# Patient Record
Sex: FEMALE | Race: WHITE | HISPANIC OR LATINO | Employment: FULL TIME | URBAN - METROPOLITAN AREA
[De-identification: names, ages, dates, MRNs, and addresses within clinical notes are randomized per-mention and may not be internally consistent; named-entity substitution may affect disease eponyms.]

---

## 2017-10-16 ENCOUNTER — HOSPITAL ENCOUNTER (OUTPATIENT)
Facility: MEDICAL CENTER | Age: 37
End: 2017-10-16
Attending: SPECIALIST
Payer: COMMERCIAL

## 2017-10-16 PROCEDURE — 87491 CHLMYD TRACH DNA AMP PROBE: CPT

## 2017-10-16 PROCEDURE — 87624 HPV HI-RISK TYP POOLED RSLT: CPT

## 2017-10-16 PROCEDURE — 87591 N.GONORRHOEAE DNA AMP PROB: CPT

## 2017-10-16 PROCEDURE — 88175 CYTOPATH C/V AUTO FLUID REDO: CPT

## 2017-10-19 LAB
C TRACH DNA GENITAL QL NAA+PROBE: NEGATIVE
CYTOLOGY REG CYTOL: NORMAL
HPV HR 12 DNA CVX QL NAA+PROBE: NEGATIVE
HPV16 DNA SPEC QL NAA+PROBE: NEGATIVE
HPV18 DNA SPEC QL NAA+PROBE: NEGATIVE
N GONORRHOEA DNA GENITAL QL NAA+PROBE: NEGATIVE
SPECIMEN SOURCE: NORMAL
SPECIMEN SOURCE: NORMAL

## 2018-01-23 ENCOUNTER — HOSPITAL ENCOUNTER (OUTPATIENT)
Dept: LAB | Facility: MEDICAL CENTER | Age: 38
End: 2018-01-23
Attending: SPECIALIST
Payer: COMMERCIAL

## 2018-01-23 PROCEDURE — 87624 HPV HI-RISK TYP POOLED RSLT: CPT

## 2018-01-23 PROCEDURE — 87591 N.GONORRHOEAE DNA AMP PROB: CPT

## 2018-01-23 PROCEDURE — 88175 CYTOPATH C/V AUTO FLUID REDO: CPT

## 2018-01-23 PROCEDURE — 87491 CHLMYD TRACH DNA AMP PROBE: CPT

## 2018-01-26 LAB
C TRACH DNA GENITAL QL NAA+PROBE: NEGATIVE
CYTOLOGY REG CYTOL: ABNORMAL
HPV HR 12 DNA CVX QL NAA+PROBE: POSITIVE
HPV16 DNA SPEC QL NAA+PROBE: NEGATIVE
HPV18 DNA SPEC QL NAA+PROBE: NEGATIVE
N GONORRHOEA DNA GENITAL QL NAA+PROBE: NEGATIVE
SPECIMEN SOURCE: ABNORMAL
SPECIMEN SOURCE: ABNORMAL

## 2018-01-27 ENCOUNTER — HOSPITAL ENCOUNTER (EMERGENCY)
Facility: MEDICAL CENTER | Age: 38
End: 2018-01-27
Attending: EMERGENCY MEDICINE
Payer: COMMERCIAL

## 2018-01-27 VITALS
SYSTOLIC BLOOD PRESSURE: 103 MMHG | BODY MASS INDEX: 35.63 KG/M2 | HEART RATE: 68 BPM | TEMPERATURE: 97.6 F | WEIGHT: 213.85 LBS | DIASTOLIC BLOOD PRESSURE: 69 MMHG | HEIGHT: 65 IN | OXYGEN SATURATION: 97 % | RESPIRATION RATE: 17 BRPM

## 2018-01-27 DIAGNOSIS — N39.0 URINARY TRACT INFECTION WITHOUT HEMATURIA, SITE UNSPECIFIED: ICD-10-CM

## 2018-01-27 LAB
APPEARANCE UR: CLEAR
COLOR UR: YELLOW
GLUCOSE UR STRIP.AUTO-MCNC: NEGATIVE MG/DL
KETONES UR STRIP.AUTO-MCNC: NEGATIVE MG/DL
LEUKOCYTE ESTERASE UR QL STRIP.AUTO: ABNORMAL
NITRITE UR QL STRIP.AUTO: NEGATIVE
PH UR STRIP.AUTO: 5.5 [PH]
PROT UR QL STRIP: ABNORMAL MG/DL
RBC UR QL AUTO: NEGATIVE
SP GR UR STRIP.AUTO: 1.02

## 2018-01-27 PROCEDURE — 99284 EMERGENCY DEPT VISIT MOD MDM: CPT

## 2018-01-27 PROCEDURE — 81002 URINALYSIS NONAUTO W/O SCOPE: CPT

## 2018-01-27 RX ORDER — CEFDINIR 300 MG/1
300 CAPSULE ORAL 2 TIMES DAILY
Qty: 20 CAP | Refills: 0 | Status: SHIPPED | OUTPATIENT
Start: 2018-01-27 | End: 2018-08-01

## 2018-01-27 ASSESSMENT — PAIN SCALES - GENERAL: PAINLEVEL_OUTOF10: 3

## 2018-01-27 NOTE — ED NOTES
Discharge instructions provided.  Pt verbalized the understanding of discharge instructions to follow up with PCP and to return to ER if condition worsens. Therese Stephens is being discharged with the following medication prescriptions Omnicef.  Pt ambulated out of ER without difficulty.

## 2018-01-27 NOTE — DISCHARGE INSTRUCTIONS
Infección urinaria en el embarazo   (Urinary Tract Infection in Pregnancy)  Miya infección urinaria (IU) puede ocurrir en cualquier lugar del tracto urinario. La infección urinaria puede comprometer los utéteres, los riñones (pielonefritis), la vejiga (cistitis) y la uretra (uretritis). Todas las mujeres embarazadas deben ser estudiadas para diagnosticar la presencia de bacterias en el tracto urinario. La identificación y el tratamiento de miya infección urinaria disminuye el riesgo de un parto prematuro y de desarrollar infecciones más graves en la madre y el bebé.   CAUSAS   Las bacterias causan perez todas las infecciones urinarias. Hay muchos factores que pueden aumentar teresa probabilidades de contraer miya infección urinaria huy el embarazo. Ellos son:   · Tener miya uretra corta.  · Falta de aseo y malos hábitos de higiene.  · Las relaciones sexuales.  · Obstrucción de la orina en el tracto urinario.  · Problemas con los músculos o nervios pélvicos.  · Diabetes.  · Obesidad.  · Problemas en la vejiga después de tener varios hijos.  · Antecedentes de infección urinaria.  SÍNTOMAS   · Dolor, ardor o sensación de ardor al orinar.  · Sentir la necesidad de orinar de inmediato (urgencia).  · Pérdida del control vesical (incontinencia urinaria).  · Orinar con más frecuencia de lo común en el embarazo.  · Malestar en la sidney inferior del abdomen o en la espalda.  · Orina turbia.  · Gerry en la orina (hematuria).  · Fiebre.   Cuando se infectan los riñones, los síntomas pueden ser:   · Dolor de espalda.  · Dolor lateral en el lado derecho más que el lado lakwhinder.  · Fiebre.  · Escalofríos.  · Náuseas.  · Vómitos.  DIAGNÓSTICO   · Análisis de orina.  · Los estudios o procedimientos adicionales pueden ser:  · Ecografía de los riñones, los uréteres, la vejiga y la uretra.  · Observar la vejiga con un tubo que ilumina (cistoscopia).  · Ciertos estudios radiográficos sólo cuando sea absolutamente necesario.  Averigüe los  resultados de teresa análisis  Consulte la fecha en que los resultados estarán disponibles. Asegúrese de obtener los resultados.   TRATAMIENTO   · Antibióticos por vía oral.  · Antibióticos por la vena (vía intravenosa), si es necesario.  INSTRUCCIONES PARA EL CUIDADO EN EL HOGAR   · Chacra los antibióticos norberto se le indicó. Tómelos todos, aunque se sienta mejor. Chacra sólo la medicación que le indicó el profesional.  · Debe ingerir miya gran cantidad de líquido para mantener la orina de yoan donte o color amarillo pálido.  · No tenga relaciones sexuales hasta que la infección haya desaparecido o el médico la autorice.  · Asegúrese de que le dorothy estudios para detectar miya infección urinaria huy el embarazo si está embarazada. Estas infecciones suelen reaparecer.   Para prevenir miya infección urinaria en el futuro:  · Practique buenos hábitos higiénicos. Siempre debe limpiarse desde adelante hacia atrás. Use el tissue sólo miya vez.  · No retenga la orina. Orine walton pronto norberto sea posible cuando tenga ganas.  · No se zainab duchas vaginales ni use desodorantes en aerosol.  · Lave con agua tibia y jabón alrededor de la sidney genital y el ano.  · Vacíe la vejiga antes y después de tener relaciones sexuales.  · Use ropa interior con algodón en la entrepierna.  · Evite la cafeína y las bebidas gaseosas. Estas sustancias irritan la vejiga.  · Ki jugo de arándanos o tome comprimidos de arándano. [place] puede disminuir el riesgo de sufrir miya infección urinaria.  · No ki alcohol.  · Cumpla con las visitas de control y hágase todos los análisis según lo programado.   SOLICITE ATENCIÓN MÉDICA SI:   · Los síntomas empeoran.  · Tiene fiebre aún después de 2 días de iniciado el tratamiento.  · Aparece miya erupción cutánea.  · Siente que usted tiene problemas con los medicamentos recetados.  · Observa miya hemorragia vaginal anormal.  SOLICITE ATENCIÓN MÉDICA DE INMEDIATO SI:   · Siente dolor en la espalda o en un  flanco.  · Comienza a sentir escalofríos.  · Observa [place] en la orina.  · Presenta náuseas o vómitos.  · Siente contracciones en el útero.  · Tiene miya perdida repentina de líquido por la vagina.  ASEGÚRESE DE QUE:   · Comprende estas instrucciones.  · Controlará shah enfermedad.  · Solicitará ayuda de inmediato si no mejora o si empeora.  Document Released: 09/11/2013 Document Revised: 12/04/2013  [employer]® Patient Information ©2014 [employer].

## 2018-01-27 NOTE — ED PROVIDER NOTES
"ED Provider Note  CHIEF COMPLAINT  Chief Complaint   Patient presents with   • Pregnancy   • Back Pain       HPI  Therese Stephens is a 37 y.o. female who presents back pain and dysuria. The patient states that she is 9 weeks pregnant and started having back pain earlier this morning. The pain is dull in nature. No radiation to her groin or to her chest. She also states that she has painful urination with no blood involved. She is pregnant but denies any abdominal pain, vaginal bleeding, vaginal discharge and has had a positive intrauterine pregnancy ultrasound completed.     REVIEW OF SYSTEMS  Pertinent positives include dysuria, back pain   Pertinent negatives include fever, nausea, vomiting, abdominal pain    PAST MEDICAL HISTORY  History reviewed. No pertinent past medical history.    FAMILY HISTORY  History reviewed. No pertinent family history.    SOCIAL HISTORY  Social History     Social History   • Marital status:      Spouse name: N/A   • Number of children: N/A   • Years of education: N/A     Social History Main Topics   • Smoking status: Never Smoker   • Smokeless tobacco: Never Used   • Alcohol use No   • Drug use: No   • Sexual activity: Not on file     Other Topics Concern   • Not on file     Social History Narrative   • No narrative on file       SURGICAL HISTORY  History reviewed. No pertinent surgical history.    CURRENT MEDICATIONS  Home Medications    **Home medications have not yet been reviewed for this encounter**         ALLERGIES  No Known Allergies    PHYSICAL EXAM  VITAL SIGNS: /71   Pulse 77   Temp 36.4 °C (97.6 °F)   Resp 16   Ht 1.651 m (5' 5\") Comment: Stated  Wt 97 kg (213 lb 13.5 oz)   LMP 11/24/2017   SpO2 100%   BMI 35.59 kg/m²    Constitutional: Well developed, Well nourished, No acute distress, Non-toxic appearance.   Eyes: PERRLA, EOMI, Conjunctiva normal, No discharge.   Abdomen: Bowel sounds normal, Soft, No tenderness, No guarding, No rebound, No masses, No " pulsatile masses, negative Wasserman's sign, no McBurney point tenderness, no peritoneal signs.   Back: No midline thoracic and lumbar spine tenderness, No bilateral CVA tenderness.   Extremities:  No edema,no obvious deformity, no evidence of trauma  Neurologic: Alert & oriented x 3, No focal deficits noted, normal gait.      RADIOLOGY/PROCEDURES  Bedside ultrasound revealed an intrauterine pregnancy with a heart rate 142 bpm    COURSE & MEDICAL DECISION MAKING  Pertinent Labs & Imaging studies reviewed. (See chart for details)  This is a charming 37-year-old female who is 9 weeks pregnant presents with urinary tract infection. Urinalysis is positive for infection. Culture has been sent. Patient received Omnicef prescription and strict return precautions have been given for increasing symptomatology. The patient has no evidence of kidney stone, appendicitis, cholecystitis or other significant intra-abdominal surgical conditions. In addition she has no evidence of  rupture of membranes or significant abnormality or pregnancy. She is to follow-up with her gynecologist at her prescheduled point.    New Prescriptions    CEFDINIR (OMNICEF) 300 MG CAP    Take 1 Cap by mouth 2 times a day.         FINAL IMPRESSION     1. Urinary tract infection without hematuria, site unspecified        DISPOSITION:  Patient will be discharged home in stable condition.    FOLLOW UP:  [address] [phone]    If symptoms worsen    Sabrina Norman M.D.  [address] [place] [id number] [phone]    Schedule an appointment as soon as possible for a visit in 3 days  As needed      OUTPATIENT MEDICATIONS:  Discharge Medication List as of 2018  3:12 PM      START taking these medications    Details   cefdinir (OMNICEF) 300 MG Cap Take 1 Cap by mouth 2 times a day., Disp-20 Cap, R-0, Print Rx Paper             Electronically signed by: Cezar Lugo,  1/27/2018 1:58 PM

## 2018-08-01 ENCOUNTER — OFFICE VISIT (OUTPATIENT)
Dept: INTERNAL MEDICINE | Facility: MEDICAL CENTER | Age: 38
End: 2018-08-01
Payer: COMMERCIAL

## 2018-08-01 VITALS
WEIGHT: 241.38 LBS | TEMPERATURE: 96.8 F | OXYGEN SATURATION: 95 % | HEIGHT: 64 IN | DIASTOLIC BLOOD PRESSURE: 86 MMHG | HEART RATE: 89 BPM | BODY MASS INDEX: 41.21 KG/M2 | SYSTOLIC BLOOD PRESSURE: 116 MMHG

## 2018-08-01 DIAGNOSIS — R21 GROIN RASH: ICD-10-CM

## 2018-08-01 DIAGNOSIS — I10 HYPERTENSION, UNSPECIFIED TYPE: ICD-10-CM

## 2018-08-01 DIAGNOSIS — Z3A.35 35 WEEKS GESTATION OF PREGNANCY: ICD-10-CM

## 2018-08-01 PROBLEM — Z34.90 PREGNANCY: Status: ACTIVE | Noted: 2018-08-01

## 2018-08-01 PROCEDURE — 99203 OFFICE O/P NEW LOW 30 MIN: CPT | Mod: GE | Performed by: INTERNAL MEDICINE

## 2018-08-01 RX ORDER — CLOTRIMAZOLE 1 %
CREAM (GRAM) TOPICAL
Qty: 1 TUBE | Refills: 1 | Status: SHIPPED | OUTPATIENT
Start: 2018-08-01 | End: 2018-09-01

## 2018-08-01 ASSESSMENT — PATIENT HEALTH QUESTIONNAIRE - PHQ9: CLINICAL INTERPRETATION OF PHQ2 SCORE: 0

## 2018-08-01 NOTE — PROGRESS NOTES
New Patient to Establish    Reason to establish: New patient to establish    CC: High blood pressure, fullness of ear, groin rash    HPI: Ms. Stephens is a very pleasant 38-year-old female with no significant past medical history presents to clinic to establish care. She is currently 8 weeks pregnant and is due in 4 weeks.    She states that she had one recording of her blood pressure while in her GYN office and is here in follow-up regarding that. Today's visit was normal blood pressure, and she also states that subsequent visits have shown normal blood pressure and her OB/GYN office.    She also complains of fullness of ears bilaterally, and this been present for the last 8 months and start of pregnancy. She denies hearing loss or discharge from the ear, also denies any ringing sensations.    Another complaint of the patient is rash in her groin. She notes it to be itching, and is tried hydrocortisone cream for a month which did not help with the rash.    She is also complaining of shortness of breath while lying down. She understands that this is normal during pregnancy.    Patient Active Problem List    Diagnosis Date Noted   • Pregnancy 08/01/2018       History reviewed. No pertinent past medical history.    Current Outpatient Prescriptions   Medication Sig Dispense Refill   • Prenatal Multivit-Min-Fe-FA (PRENATAL VITAMINS PO) Take  by mouth.     • clotrimazole (LOTRIMIN) 1 % Cream Apply over rash on groin area. 1 Tube 1     No current facility-administered medications for this visit.        Allergies as of 08/01/2018   • (No Known Allergies)       Social History     Social History   • Marital status:      Spouse name: N/A   • Number of children: N/A   • Years of education: N/A     Occupational History   • Not on file.     Social History Main Topics   • Smoking status: Never Smoker   • Smokeless tobacco: Never Used   • Alcohol use No   • Drug use: No   • Sexual activity: Not on file     Other Topics Concern  "  • Not on file     Social History Narrative   • No narrative on file       Family History   Problem Relation Age of Onset   • Hypertension Neg Hx    • Heart Disease Neg Hx    • Stroke Neg Hx    • Cancer Neg Hx        History reviewed. No pertinent surgical history.    ROS: As per HPI. Additional pertinent symptoms as noted below.    Constitutional: Denies fevers, Denies weight changes  Eyes: Denies changes in vision, no eye pain  Ears/Nose/Throat/Mouth: Denies nasal congestion or sore throat   Cardiovascular: Denies chest pain or palpitations   Respiratory: Denies shortness of breath , Denies cough  Gastrointestinal/Hepatic: Denies abdominal pain, nausea, vomiting, diarrhea, constipation or GI bleeding   Genitourinary: Denies bladder dysfunction, dysuria or frequency  Musculoskeletal/Rheum: Denies  joint pain and swelling   Skin/Breast: Denies rash, denies breast lumps or discharge  Neurological: Denies headache, confusion, memory loss or focal weakness/parasthesias  Psychiatric: denies mood disorder   Endocrine: denies hx of diabetes or thyroid dysfunction  Heme/Oncology/Lymph Nodes: Denies enlarged lymph nodes, denies brusing or known bleeding disorder  Allergic/Immunologic: Denies hx of allergies            /86   Pulse 89   Temp 36 °C (96.8 °F)   Ht 1.626 m (5' 4\")   Wt 109.5 kg (241 lb 6 oz)   LMP 11/24/2017   SpO2 95%   BMI 41.43 kg/m²     Physical Exam  General:  Alert and oriented, No apparent distress.    Eyes: Pupils equal and reactive. No scleral icterus.    Throat: Clear no erythema or exudates noted.    Neck: Supple. No lymphadenopathy noted. Thyroid not enlarged.    Lungs: Clear to auscultation and percussion bilaterally.    Cardiovascular: Regular rate and rhythm. No murmurs, rubs or gallops.    Abdomen:  Benign. No rebound or guarding noted.    Extremities: No clubbing, cyanosis, edema.    Skin: Erythematous flaky rash noted bilateral groins    Note: I have reviewed all pertinent labs " and diagnostic tests associated with this visit with specific comments listed under the assessment and plan below    Assessment and Plan    1. 35 weeks gestation of pregnancy  Due in 4 weeks' time.  Following with OB/GYN, Dr. Chase.  Appears to have her get regular follow-up.  Advised patient to follow-up with her OB/GYN until term.  On prenatal vitamins.  States that she got routine screening labs done.    2. Hypertension, unspecified type  Does not appear to have high blood pressure.  This patient monitor blood pressure at home.  If she indeed have high blood pressure, this would be concerning for a clamshell and it could be in her best interest to follow-up with her GYN.  Advise patient that she should follow up with OB/GYN for further complaints regarding this.    3. Groin rash  Appears to be tenia cruris.  Hydrocortisone did not bark which is not the treatment of choice for the rash.  Clotrimazole cream for his rash.  It is a grade B for use in pregnancy, and is therefore safe as a topical medication.    4. Healthcare maintenance  Pap smear recently, not due for another 3-5 years.  Up-to-date on vaccines per patient.  Advised follow-up with OB/GYN.      Followup: Return if symptoms worsen or fail to improve, for Follow up after pregancy.        Signed by: Antoine Altman M.D.

## 2018-08-01 NOTE — PATIENT INSTRUCTIONS
Pitiriasis versicolor  (Tinea Versicolor)  La pitiriasis versicolor es miya infección frecuente por hongos en la piel. Produce miya erupción cutánea que se manifiesta norberto manchas claras u oscuras. Es más frecuente que aparezca en la piel del pecho, la espalda, el junie o la parte superior de los brazos. Esta afección es más frecuente huy climas cálidos.  Con excepción del aspecto de la piel, la pitiriasis versicolor por lo general no causa otros problemas. En la mayoría de los casos, la infección desaparece en unas pocas semanas con un tratamiento. Pueden pasar algunos meses para que las manchas desaparezcan.  CAUSAS  La pitiriasis versicolor se produce cuando comienza a proliferar un tipo de hongo que normalmente está presente en la piel. Jen hongo es miya clase de levadura. Se desconoce la causa exacta de la proliferación. Esta afección no puede transmitirse de miya persona a otra (es no contagiosa).  FACTORES DE RIESGO  Esta afección es más probable en presencia de determinados factores, norberto los siguientes:  · Calor y humedad.  · Sudoración excesiva.  · Cambios hormonales.  · Piel grasosa.  · El sistema de defensa (inmunitario) débil.  SÍNTOMAS  Los síntomas de esta afección pueden incluir lo siguiente:  · Miya erupción cutánea formada por manchas claras u oscuras. La erupción cutánea puede consistir en lo siguiente:  ¨ Zonas con manchas de color brina o tom sobre piel nik.  ¨ Zonas con manchas de color chamorro o marrón sobre piel oscura.  ¨ Zonas de piel con manchas que no se broncean.  ¨ Bordes shea definidos.  ¨ Escamas en las zonas pigmentadas.  · Picazón leve.  DIAGNÓSTICO  Por lo general, el médico puede diagnosticar esta afección al observarle la piel. Huy el examen, el médico puede usar miya davida ultravioleta norberto ayuda para determinar el ines de la infección. En algunos casos, puede tomarse miya muestra de piel rascando la sidney de la erupción cutánea. Esta muestra se analizará con el microscopio  para detectar la proliferación de la levadura.  TRATAMIENTO  El tratamiento de esta infección puede incluir lo siguiente:  · Champú anticaspa que se aplica sobre la piel afectada al sander miya ducha o un baño de inmersión.  · Jabones, lociones o cremas medicinales para la piel, de venta dacia.  · Medicamentos antimicóticos recetados en forma de comprimidos o crema para la piel.  · Medicamentos que ayudan a aliviar la picazón.  INSTRUCCIONES PARA EL CUIDADO EN EL HOGAR  · [place] los medicamentos solamente norberto se lo haya indicado el médico.  · Aplique el champú anticaspa en la sidney afectada si se lo ha indicado el médico. Pueden indicarle que se frote la sidney de piel afectada huy varios minutos cada día.  · No se rasque la sidney de piel afectada.  · Evite el clima cálido y húmedo.  · No use cabinas de bronceado.  · Trate de no transpirar demasiado.  SOLICITE ATENCIÓN MÉDICA SI:  · Los síntomas empeoran.  · Tiene fiebre.  · Presenta enrojecimiento, hinchazón o dolor en la sidney de la erupción cutánea.  · Observa líquido, [place] o pus que salen de la erupción cutánea.  · La erupción cutánea reaparece después del tratamiento.  Esta información no tiene norberto fin reemplazar el consejo del médico. Asegúrese de hacerle al médico cualquier pregunta que tenga.  Document Released: 09/27/2006 Document Revised: 01/08/2016 Document Reviewed: 09/29/2015  [employer] Interactive Patient Education © 2017 [employer]

## 2018-08-25 ENCOUNTER — HOSPITAL ENCOUNTER (OUTPATIENT)
Facility: MEDICAL CENTER | Age: 38
End: 2018-08-25
Attending: SPECIALIST | Admitting: SPECIALIST
Payer: COMMERCIAL

## 2018-08-25 VITALS
HEIGHT: 65 IN | TEMPERATURE: 98.8 F | DIASTOLIC BLOOD PRESSURE: 78 MMHG | HEART RATE: 65 BPM | WEIGHT: 245 LBS | BODY MASS INDEX: 40.82 KG/M2 | SYSTOLIC BLOOD PRESSURE: 111 MMHG

## 2018-08-25 PROCEDURE — 59025 FETAL NON-STRESS TEST: CPT | Performed by: SPECIALIST

## 2018-08-26 ENCOUNTER — HOSPITAL ENCOUNTER (INPATIENT)
Facility: MEDICAL CENTER | Age: 38
LOS: 3 days | End: 2018-08-29
Attending: SPECIALIST | Admitting: SPECIALIST
Payer: COMMERCIAL

## 2018-08-26 DIAGNOSIS — R10.2 FEMALE PELVIC PAIN: Primary | ICD-10-CM

## 2018-08-26 LAB
BASOPHILS # BLD AUTO: 0.3 % (ref 0–1.8)
BASOPHILS # BLD: 0.03 K/UL (ref 0–0.12)
EOSINOPHIL # BLD AUTO: 0.04 K/UL (ref 0–0.51)
EOSINOPHIL NFR BLD: 0.4 % (ref 0–6.9)
ERYTHROCYTE [DISTWIDTH] IN BLOOD BY AUTOMATED COUNT: 47 FL (ref 35.9–50)
HCT VFR BLD AUTO: 37.3 % (ref 37–47)
HGB BLD-MCNC: 12.6 G/DL (ref 12–16)
HOLDING TUBE BB 8507: NORMAL
IMM GRANULOCYTES # BLD AUTO: 0.06 K/UL (ref 0–0.11)
IMM GRANULOCYTES NFR BLD AUTO: 0.6 % (ref 0–0.9)
LYMPHOCYTES # BLD AUTO: 2.47 K/UL (ref 1–4.8)
LYMPHOCYTES NFR BLD: 24 % (ref 22–41)
MCH RBC QN AUTO: 28.2 PG (ref 27–33)
MCHC RBC AUTO-ENTMCNC: 33.8 G/DL (ref 33.6–35)
MCV RBC AUTO: 83.4 FL (ref 81.4–97.8)
MONOCYTES # BLD AUTO: 0.6 K/UL (ref 0–0.85)
MONOCYTES NFR BLD AUTO: 5.8 % (ref 0–13.4)
NEUTROPHILS # BLD AUTO: 7.08 K/UL (ref 2–7.15)
NEUTROPHILS NFR BLD: 68.9 % (ref 44–72)
NRBC # BLD AUTO: 0 K/UL
NRBC BLD-RTO: 0 /100 WBC
PLATELET # BLD AUTO: 338 K/UL (ref 164–446)
PMV BLD AUTO: 10 FL (ref 9–12.9)
RBC # BLD AUTO: 4.47 M/UL (ref 4.2–5.4)
WBC # BLD AUTO: 10.3 K/UL (ref 4.8–10.8)

## 2018-08-26 PROCEDURE — 85025 COMPLETE CBC W/AUTO DIFF WBC: CPT

## 2018-08-26 PROCEDURE — 770002 HCHG ROOM/CARE - OB PRIVATE (112)

## 2018-08-26 RX ORDER — MISOPROSTOL 200 UG/1
800 TABLET ORAL
Status: DISCONTINUED | OUTPATIENT
Start: 2018-08-26 | End: 2018-08-27 | Stop reason: HOSPADM

## 2018-08-26 RX ORDER — ENEMA 19; 7 G/133ML; G/133ML
1 ENEMA RECTAL PRN
Status: DISCONTINUED | OUTPATIENT
Start: 2018-08-26 | End: 2018-08-27 | Stop reason: HOSPADM

## 2018-08-26 RX ORDER — METHYLERGONOVINE MALEATE 0.2 MG/ML
0.2 INJECTION INTRAVENOUS
Status: DISCONTINUED | OUTPATIENT
Start: 2018-08-26 | End: 2018-08-27 | Stop reason: HOSPADM

## 2018-08-26 RX ORDER — HYDROXYZINE 50 MG/1
50 TABLET, FILM COATED ORAL EVERY 6 HOURS PRN
Status: DISCONTINUED | OUTPATIENT
Start: 2018-08-26 | End: 2018-08-27 | Stop reason: HOSPADM

## 2018-08-26 RX ORDER — SODIUM CHLORIDE, SODIUM LACTATE, POTASSIUM CHLORIDE, CALCIUM CHLORIDE 600; 310; 30; 20 MG/100ML; MG/100ML; MG/100ML; MG/100ML
INJECTION, SOLUTION INTRAVENOUS CONTINUOUS
Status: DISPENSED | OUTPATIENT
Start: 2018-08-26 | End: 2018-08-27

## 2018-08-26 RX ORDER — ALUMINA, MAGNESIA, AND SIMETHICONE 2400; 2400; 240 MG/30ML; MG/30ML; MG/30ML
30 SUSPENSION ORAL EVERY 6 HOURS PRN
Status: DISCONTINUED | OUTPATIENT
Start: 2018-08-26 | End: 2018-08-27 | Stop reason: HOSPADM

## 2018-08-26 RX ORDER — OXYTOCIN 10 [USP'U]/ML
10 INJECTION, SOLUTION INTRAMUSCULAR; INTRAVENOUS
Status: DISCONTINUED | OUTPATIENT
Start: 2018-08-26 | End: 2018-08-27 | Stop reason: HOSPADM

## 2018-08-26 ASSESSMENT — PATIENT HEALTH QUESTIONNAIRE - PHQ9
SUM OF ALL RESPONSES TO PHQ9 QUESTIONS 1 AND 2: 0
2. FEELING DOWN, DEPRESSED, IRRITABLE, OR HOPELESS: NOT AT ALL
1. LITTLE INTEREST OR PLEASURE IN DOING THINGS: NOT AT ALL

## 2018-08-26 ASSESSMENT — LIFESTYLE VARIABLES
EVER_SMOKED: NEVER
ALCOHOL_USE: NO

## 2018-08-26 NOTE — PROGRESS NOTES
, EDC , [place] 39w1d. Pt presents to L&D with c/o lower back pain and spotting. Pt denies LOF or UCs and reports + Fetal movement. Pt escorted to triage room, oriented to room and unit, and external monitors applied. POC discussed with pt and s/o and encouraged to state needs or questions at any time.    2250 SVE by LV Godoy RN 3/50/2302 Dr. Mendosa called and given report. D/C orders received    2305 D/C instructions reviewed with pt and s/o who state understanding. Pt d/c to self.

## 2018-08-27 PROCEDURE — 700111 HCHG RX REV CODE 636 W/ 250 OVERRIDE (IP): Performed by: SPECIALIST

## 2018-08-27 PROCEDURE — 10907ZC DRAINAGE OF AMNIOTIC FLUID, THERAPEUTIC FROM PRODUCTS OF CONCEPTION, VIA NATURAL OR ARTIFICIAL OPENING: ICD-10-PCS | Performed by: SPECIALIST

## 2018-08-27 PROCEDURE — 304965 HCHG RECOVERY SERVICES

## 2018-08-27 PROCEDURE — 700102 HCHG RX REV CODE 250 W/ 637 OVERRIDE(OP): Performed by: SPECIALIST

## 2018-08-27 PROCEDURE — 90715 TDAP VACCINE 7 YRS/> IM: CPT

## 2018-08-27 PROCEDURE — 36415 COLL VENOUS BLD VENIPUNCTURE: CPT

## 2018-08-27 PROCEDURE — 303615 HCHG EPIDURAL/SPINAL ANESTHESIA FOR LABOR

## 2018-08-27 PROCEDURE — 3E0234Z INTRODUCTION OF SERUM, TOXOID AND VACCINE INTO MUSCLE, PERCUTANEOUS APPROACH: ICD-10-PCS | Performed by: SPECIALIST

## 2018-08-27 PROCEDURE — A9270 NON-COVERED ITEM OR SERVICE: HCPCS | Performed by: SPECIALIST

## 2018-08-27 PROCEDURE — 700111 HCHG RX REV CODE 636 W/ 250 OVERRIDE (IP)

## 2018-08-27 PROCEDURE — 700101 HCHG RX REV CODE 250

## 2018-08-27 PROCEDURE — 59409 OBSTETRICAL CARE: CPT

## 2018-08-27 PROCEDURE — 700105 HCHG RX REV CODE 258: Performed by: SPECIALIST

## 2018-08-27 PROCEDURE — 90471 IMMUNIZATION ADMIN: CPT

## 2018-08-27 PROCEDURE — 700112 HCHG RX REV CODE 229

## 2018-08-27 PROCEDURE — 770002 HCHG ROOM/CARE - OB PRIVATE (112)

## 2018-08-27 PROCEDURE — 700101 HCHG RX REV CODE 250: Performed by: ANESTHESIOLOGY

## 2018-08-27 RX ORDER — VITAMIN A ACETATE, BETA CAROTENE, ASCORBIC ACID, CHOLECALCIFEROL, .ALPHA.-TOCOPHEROL ACETATE, DL-, THIAMINE MONONITRATE, RIBOFLAVIN, NIACINAMIDE, PYRIDOXINE HYDROCHLORIDE, FOLIC ACID, CYANOCOBALAMIN, CALCIUM CARBONATE, FERROUS FUMARATE, ZINC OXIDE, CUPRIC OXIDE 3080; 12; 120; 400; 1; 1.84; 3; 20; 22; 920; 25; 200; 27; 10; 2 [IU]/1; UG/1; MG/1; [IU]/1; MG/1; MG/1; MG/1; MG/1; MG/1; [IU]/1; MG/1; MG/1; MG/1; MG/1; MG/1
1 TABLET, FILM COATED ORAL EVERY MORNING
Status: DISCONTINUED | OUTPATIENT
Start: 2018-08-27 | End: 2018-08-29 | Stop reason: HOSPADM

## 2018-08-27 RX ORDER — ONDANSETRON 2 MG/ML
4 INJECTION INTRAMUSCULAR; INTRAVENOUS EVERY 6 HOURS PRN
Status: DISCONTINUED | OUTPATIENT
Start: 2018-08-27 | End: 2018-08-29 | Stop reason: HOSPADM

## 2018-08-27 RX ORDER — DOCUSATE SODIUM 100 MG/1
100 CAPSULE, LIQUID FILLED ORAL 2 TIMES DAILY PRN
Status: DISCONTINUED | OUTPATIENT
Start: 2018-08-27 | End: 2018-08-29 | Stop reason: HOSPADM

## 2018-08-27 RX ORDER — IBUPROFEN 600 MG/1
600 TABLET ORAL EVERY 6 HOURS PRN
Status: DISCONTINUED | OUTPATIENT
Start: 2018-08-27 | End: 2018-08-29 | Stop reason: HOSPADM

## 2018-08-27 RX ORDER — ONDANSETRON 4 MG/1
4 TABLET, ORALLY DISINTEGRATING ORAL EVERY 6 HOURS PRN
Status: DISCONTINUED | OUTPATIENT
Start: 2018-08-27 | End: 2018-08-29 | Stop reason: HOSPADM

## 2018-08-27 RX ORDER — METHYLERGONOVINE MALEATE 0.2 MG/ML
0.2 INJECTION INTRAVENOUS
Status: DISCONTINUED | OUTPATIENT
Start: 2018-08-27 | End: 2018-08-29 | Stop reason: HOSPADM

## 2018-08-27 RX ORDER — OXYCODONE AND ACETAMINOPHEN 10; 325 MG/1; MG/1
1 TABLET ORAL EVERY 4 HOURS PRN
Status: DISCONTINUED | OUTPATIENT
Start: 2018-08-27 | End: 2018-08-29 | Stop reason: HOSPADM

## 2018-08-27 RX ORDER — ROPIVACAINE HYDROCHLORIDE 2 MG/ML
INJECTION, SOLUTION EPIDURAL; INFILTRATION; PERINEURAL
Status: COMPLETED
Start: 2018-08-27 | End: 2018-08-27

## 2018-08-27 RX ORDER — OXYCODONE HYDROCHLORIDE AND ACETAMINOPHEN 5; 325 MG/1; MG/1
1 TABLET ORAL EVERY 4 HOURS PRN
Status: DISCONTINUED | OUTPATIENT
Start: 2018-08-27 | End: 2018-08-29 | Stop reason: HOSPADM

## 2018-08-27 RX ORDER — ACETAMINOPHEN 325 MG/1
325 TABLET ORAL EVERY 4 HOURS PRN
Status: DISCONTINUED | OUTPATIENT
Start: 2018-08-27 | End: 2018-08-29 | Stop reason: HOSPADM

## 2018-08-27 RX ORDER — SODIUM CHLORIDE, SODIUM LACTATE, POTASSIUM CHLORIDE, CALCIUM CHLORIDE 600; 310; 30; 20 MG/100ML; MG/100ML; MG/100ML; MG/100ML
INJECTION, SOLUTION INTRAVENOUS PRN
Status: DISCONTINUED | OUTPATIENT
Start: 2018-08-27 | End: 2018-08-29 | Stop reason: HOSPADM

## 2018-08-27 RX ORDER — BUPIVACAINE HYDROCHLORIDE 2.5 MG/ML
INJECTION, SOLUTION EPIDURAL; INFILTRATION; INTRACAUDAL
Status: ACTIVE
Start: 2018-08-27 | End: 2018-08-27

## 2018-08-27 RX ADMIN — FENTANYL CITRATE 100 MCG: 50 INJECTION INTRAMUSCULAR; INTRAVENOUS at 01:48

## 2018-08-27 RX ADMIN — IBUPROFEN 600 MG: 600 TABLET, FILM COATED ORAL at 21:20

## 2018-08-27 RX ADMIN — EPHEDRINE SULFATE 10 MG: 50 INJECTION INTRAVENOUS at 03:38

## 2018-08-27 RX ADMIN — Medication 1 TABLET: at 10:06

## 2018-08-27 RX ADMIN — ROPIVACAINE HYDROCHLORIDE 100 ML: 2 INJECTION, SOLUTION EPIDURAL; INFILTRATION at 02:49

## 2018-08-27 RX ADMIN — OXYCODONE HYDROCHLORIDE AND ACETAMINOPHEN 1 TABLET: 10; 325 TABLET ORAL at 21:20

## 2018-08-27 RX ADMIN — SODIUM CHLORIDE, POTASSIUM CHLORIDE, SODIUM LACTATE AND CALCIUM CHLORIDE: 600; 310; 30; 20 INJECTION, SOLUTION INTRAVENOUS at 02:45

## 2018-08-27 RX ADMIN — Medication 2000 ML/HR: at 05:45

## 2018-08-27 RX ADMIN — SODIUM CHLORIDE, POTASSIUM CHLORIDE, SODIUM LACTATE AND CALCIUM CHLORIDE: 600; 310; 30; 20 INJECTION, SOLUTION INTRAVENOUS at 03:02

## 2018-08-27 RX ADMIN — TETANUS TOXOID, REDUCED DIPHTHERIA TOXOID AND ACELLULAR PERTUSSIS VACCINE, ADSORBED 0.5 ML: 5; 2.5; 8; 8; 2.5 SUSPENSION INTRAMUSCULAR at 20:44

## 2018-08-27 RX ADMIN — ACETAMINOPHEN 325 MG: 325 TABLET, FILM COATED ORAL at 06:06

## 2018-08-27 RX ADMIN — EPHEDRINE SULFATE 10 MG: 50 INJECTION INTRAVENOUS at 03:15

## 2018-08-27 RX ADMIN — IBUPROFEN 600 MG: 600 TABLET, FILM COATED ORAL at 12:58

## 2018-08-27 RX ADMIN — SODIUM CHLORIDE, POTASSIUM CHLORIDE, SODIUM LACTATE AND CALCIUM CHLORIDE: 600; 310; 30; 20 INJECTION, SOLUTION INTRAVENOUS at 03:43

## 2018-08-27 RX ADMIN — Medication 125 ML/HR: at 06:10

## 2018-08-27 RX ADMIN — EPHEDRINE SULFATE 10 MG: 50 INJECTION INTRAVENOUS at 03:23

## 2018-08-27 ASSESSMENT — PATIENT HEALTH QUESTIONNAIRE - PHQ9
1. LITTLE INTEREST OR PLEASURE IN DOING THINGS: NOT AT ALL
SUM OF ALL RESPONSES TO PHQ9 QUESTIONS 1 AND 2: 0
2. FEELING DOWN, DEPRESSED, IRRITABLE, OR HOPELESS: NOT AT ALL

## 2018-08-27 ASSESSMENT — PAIN SCALES - GENERAL
PAINLEVEL_OUTOF10: 9
PAINLEVEL_OUTOF10: 4
PAINLEVEL_OUTOF10: 8
PAINLEVEL_OUTOF10: 9
PAINLEVEL_OUTOF10: 7
PAINLEVEL_OUTOF10: 2
PAINLEVEL_OUTOF10: 0

## 2018-08-27 NOTE — PROGRESS NOTES
Multiple R (para 2 with 2 previous vaginal deliveries) who has had her prenatal care with Dr. Chase during this pregnancy and who is today 39 weeks and 2 days gestation and she presented to labor and delivery this evening with complaints of frequent and painful uterine contractions. On presentation to Labor and Delivery her cervix initially was found to be about 3-4 cm dilated and 100% effaced and -2 to -3 station and when her cervix was rechecked at 12 minutes after 8:00 this evening her cervix then was found to be about 5-6 cm dilated and completely effaced and -2 station. I received a call from the patient's nurse, Marialuisa, and I asked her to have the patient admitted. The patient is declining a labor epidural. Of note according to the records the patient's recent vaginal culture for GBS came back negative. We will anticipate an obstetrical delivery.   Germán Mendosa M.D.

## 2018-08-27 NOTE — PROGRESS NOTES
-Report received from LIZBETH Elam. Pt transferred to S213. POC discussed, admission procedures complete.   -Dr. Mendosa at bedside to discuss POC, pt verbalized understanding.   215-Dr. Mendosa at bedside for SVE- POC discussed, epidural discussed, pt verbalzied agreement and understanding. Fluid bolus started for epidural.   230-Dr. Ritchie at bedside for epidural.   255-RN remains at bedside after epidural placement to place powers catheter. At 0256, FHT audibly down to 's. FHT remain down after several attempts at turning pt. 025, RN called for assistance, increased fluids, started oxygen and physician notified. FSE placed, BP down to 73/40. Dr. Ritchie notified, new orders for ephedrine. Ephedrine given x3 per Dr. Ritchie for hypotension. Dr. Mendosa at bedside for SVE Lip/-1, Dr. Mendosa manually rotated fetus out of OP position.   317-FHT back to baseline. Dr. Mendosa aware of fetal tachycardia   0345-Dr. Ritchie at bedside for updates.   0544-, viable female per Dr. Mendosa, infant taken to warmer, Apgars 8/8. Infant taken to NBN with infant RN and RT. Infant tachycardic.   0555-Dr. Mendosa notified of pts increased temp of 100.0F, new order to given tylenol.   700-Report given to LIZBETH Koroma.

## 2018-08-27 NOTE — PROGRESS NOTES
1844- pt arrived to unit with c/o of UC since 0500. UC are every 5 minutes. Pt denies LOF or VB, states +FM but less than baby's normal. Pt of Dr. Chase, EDC 8/31, GA 39.2.   1900- EFM/TOCO in place. VSS. SVE 3-4/100/-2 -3.   2012- SVE 5-6/100/-2.   2020- spoke with Dr. Aviles regarding pt. Orders to admit pt to labor.   2030- report to JEANCARLOS Fowler RN

## 2018-08-27 NOTE — H&P
DATE OF ADMISSION:  2018    REASON FOR ADMISSION:  Augmentation of labor given a history of a 10-pound   12-ounce infant in her last pregnancy.    HISTORY OF PRESENT ILLNESS:  This is a 38-year-old  3, para 2, at 39   and 2/7 weeks' gestation based on last menstrual period consistent with an   8-week ultrasound, who now elects to proceed forward with augmentation of   labor, especially in light of her history of macrosomic infant.  The risks,   benefits, and alternatives have been addressed.  She has asked appropriate   questions, signed the appropriate consents, and wished to proceed forward with   admission as planned.    PAST MEDICAL HISTORY:  Migraine headaches.    PAST SURGICAL HISTORY:  None.    OBSTETRICAL HISTORY:  In , at term, the patient had spontaneous vaginal   delivery.  In , patient had a spontaneous vaginal delivery, 10-pound   12-ounce infant.  This is her third pregnancy.    SOCIAL HISTORY:  She denies use of any alcohol, tobacco, or recreational drug   use.    MEDICATIONS:  Prenatal vitamins.    ALLERGIES:  No known drug allergies.    PHYSICAL EXAMINATION:  VITAL SIGNS:  She is afebrile, hemodynamically stable.  HEART:  Regular rate and rhythm.  CHEST:  Clear to auscultation bilaterally.  ABDOMEN:  Soft, gravid, nontender.  PELVIC:  Sterile vaginal exam, 1, 50%, and -2 station.  EXTREMITIES:  Nontender.    Prenatal care labs are all in order.  She is group B strep negative.    ASSESSMENT AND PLAN:  A 38-year-old  3, para 2 at term with overall   reassuring fetal status, now elects to proceed forward with a spontaneous   vaginal delivery at term.  Risks, benefits, and alternatives have been   addressed.  She has asked appropriate questions, signed the appropriate   consents, and wished to proceed forward with Pitocin augmentation.       ____________________________________     MD TIFFANIE FERREIRA / LUMA    DD:  2018 14:32:49  DT:  2018 15:37:07    D#:   [id number]  Job#:  [id number]

## 2018-08-27 NOTE — CARE PLAN
Problem: Communication  Goal: The ability to communicate needs accurately and effectively will improve  Outcome: PROGRESSING AS EXPECTED  Reviewed plan of care with patient who verbalized understanding.    Problem: Altered physiologic condition related to immediate post-delivery state and potential for bleeding/hemorrhage  Goal: Patient physiologically stable as evidenced by normal lochia, palpable uterine involution and vital signs within normal limits  Outcome: PROGRESSING AS EXPECTED  Fundus firm.  Lochia scant to light.  VSS.

## 2018-08-27 NOTE — DELIVERY NOTE
DATE OF SERVICE:  2018    The patient is a very pleasant 38-year-old multipara (she has had 2 previous   vaginal deliveries), who was admitted to [facility] labor   and delivery yesterday evening, at 39 weeks and 2 days' gestation after she   had presented with complaints of frequent and painful uterine contractions and   on presentation to labor and delivery, her cervix was found to be about 3-4   cm dilated and completely effaced and the station was -2 to -3 station and   then when her cervix was rechecked 1-2 hours later, it was found to have   changed to 5-6 cm dilation and completely effaced and -2 station.  She was   admitted.  She declined labor epidural at that time.  Her recent vaginal   culture for group B strep did come back negative for group B strep.  Later,   artificial rupture of membranes was performed and clear amniotic fluid was   observed.  Then, early in the morning, she received a labor epidural and her   labor epidural functioned well.  She experienced hypotension following her   epidural and this was followed by fetal bradycardia, which resolved after   ephedrine was given.  There was some fetal tachycardia following   administration of ephedrine.  She eventually achieved complete cervical   dilation and started pushing.  Then, at sometime between 5:30 in the morning   and at 6:00 in the morning, now Monday, 2018, she went on to have a   normal spontaneous vaginal delivery, at 39 weeks and 3 days' gestation, she   was delivered of a live term female  with Apgar scores of 8 and 8 at   one and five minutes respectively and  weight of 4540 grams.  Baby   was delivered over an intact perineum under continuous epidural anesthesia.  The   placenta was simply delivered and examined and found to be complete.    Examination of the vulva and vagina revealed that there were no vulvar or   vaginal lacerations.  Bimanual vaginal exam was performed and revealed  that   the uterus was firm.  Bleeding appeared to be minimal.    ESTIMATED BLOOD LOSS:  Approximately 300 mL.       ____________________________________     CRISTIANA HERNANDEZ MD    MED / NTS    DD:  08/27/2018 06:23:22  DT:  08/27/2018 07:32:47    D#:  [id number]  Job#:  [id number]    cc: AUSTEN GONZALES MD

## 2018-08-27 NOTE — PROGRESS NOTES
0815- Patient arrived to Room S310.  Report received from ARMINDA Koroma RN.  Patient assessment done.  IV patent.  Discussed pain management with patient.  Patient prefers to call for pain medication.  Patient denied need for pain intervention at this time.  Patient oriented to room, call system, and infant security.  Reviewed plan of care.  0845- Patient ambulated to NBN to visit infant.  Patient with steady gait.

## 2018-08-27 NOTE — PROGRESS NOTES
0700-Report received from JEANCARLOS Fowler RN. Pt resting comfortably in bed. Family at bedside. Infant in  nursery. POC discussed with patient. Denies needs at this time.     0810-Pt up to bathroom, voided, niesha-care provided, gown changed. Pt transferred to  via wheelchair. Report given to Shirley NIEVES, pt care assumed.

## 2018-08-27 NOTE — DELIVERY NOTE
Normal spontaneous vaginal delivery.  Live term female .  Apgars scores 8 and 8 at one and 5 minutes respectively.  [place] weight not yet recorded but estimated to be approximately 4 kg.  Baby was delivered over an intact perineum under continuous epidural anesthesia.  The placenta was simply delivered and examined and found to be complete.  Examination of the vulva and vagina revealed that there were no lacerations.  Next, bimanual exam revealed that the uterus was firm.  Bleeding appeared to be minimal.  The estimated blood loss was approximately 300 mL.  Germán Mendosa M.D.

## 2018-08-27 NOTE — PROGRESS NOTES
The patient received a labor epidural.  Following her labor epidural fetal fetal bradycardia was noted. It is felt that this fetal bradycardia was due to maternal hypotension following placement of labor epidural. She was given a dose of ephedrine. There is now no longer fetal bradycardia. The cervix is noted to be completely dilated and the position was occiput posterior. The position was manually rotated to occiput anterior. We will resume pushing.  Germán Mendosa M.D.

## 2018-08-28 LAB
ERYTHROCYTE [DISTWIDTH] IN BLOOD BY AUTOMATED COUNT: 50.1 FL (ref 35.9–50)
HCT VFR BLD AUTO: 33.5 % (ref 37–47)
HGB BLD-MCNC: 10.5 G/DL (ref 12–16)
MCH RBC QN AUTO: 27.1 PG (ref 27–33)
MCHC RBC AUTO-ENTMCNC: 31.3 G/DL (ref 33.6–35)
MCV RBC AUTO: 86.6 FL (ref 81.4–97.8)
PLATELET # BLD AUTO: 314 K/UL (ref 164–446)
PMV BLD AUTO: 9.7 FL (ref 9–12.9)
RBC # BLD AUTO: 3.87 M/UL (ref 4.2–5.4)
WBC # BLD AUTO: 12.5 K/UL (ref 4.8–10.8)

## 2018-08-28 PROCEDURE — 770002 HCHG ROOM/CARE - OB PRIVATE (112)

## 2018-08-28 PROCEDURE — A9270 NON-COVERED ITEM OR SERVICE: HCPCS | Performed by: SPECIALIST

## 2018-08-28 PROCEDURE — 700112 HCHG RX REV CODE 229: Performed by: SPECIALIST

## 2018-08-28 PROCEDURE — 36415 COLL VENOUS BLD VENIPUNCTURE: CPT

## 2018-08-28 PROCEDURE — 700102 HCHG RX REV CODE 250 W/ 637 OVERRIDE(OP): Performed by: SPECIALIST

## 2018-08-28 PROCEDURE — 85027 COMPLETE CBC AUTOMATED: CPT

## 2018-08-28 RX ORDER — IBUPROFEN 600 MG/1
600 TABLET ORAL EVERY 6 HOURS PRN
Qty: 30 TAB | Refills: 0 | Status: ON HOLD | OUTPATIENT
Start: 2018-08-28 | End: 2018-09-03

## 2018-08-28 RX ORDER — OXYCODONE HYDROCHLORIDE AND ACETAMINOPHEN 5; 325 MG/1; MG/1
1 TABLET ORAL EVERY 4 HOURS PRN
Qty: 15 TAB | Refills: 0 | Status: SHIPPED | OUTPATIENT
Start: 2018-08-28 | End: 2018-09-04

## 2018-08-28 RX ADMIN — IBUPROFEN 600 MG: 600 TABLET, FILM COATED ORAL at 22:27

## 2018-08-28 RX ADMIN — OXYCODONE HYDROCHLORIDE AND ACETAMINOPHEN 1 TABLET: 10; 325 TABLET ORAL at 16:21

## 2018-08-28 RX ADMIN — OXYCODONE HYDROCHLORIDE AND ACETAMINOPHEN 1 TABLET: 10; 325 TABLET ORAL at 22:27

## 2018-08-28 RX ADMIN — Medication 1 TABLET: at 09:00

## 2018-08-28 RX ADMIN — DOCUSATE SODIUM 100 MG: 100 CAPSULE, LIQUID FILLED ORAL at 16:21

## 2018-08-28 RX ADMIN — IBUPROFEN 600 MG: 600 TABLET, FILM COATED ORAL at 16:22

## 2018-08-28 RX ADMIN — IBUPROFEN 600 MG: 600 TABLET, FILM COATED ORAL at 09:00

## 2018-08-28 RX ADMIN — OXYCODONE HYDROCHLORIDE AND ACETAMINOPHEN 1 TABLET: 10; 325 TABLET ORAL at 08:59

## 2018-08-28 ASSESSMENT — PAIN SCALES - GENERAL
PAINLEVEL_OUTOF10: 2
PAINLEVEL_OUTOF10: 8
PAINLEVEL_OUTOF10: 2
PAINLEVEL_OUTOF10: 2
PAINLEVEL_OUTOF10: 7
PAINLEVEL_OUTOF10: 8
PAINLEVEL_OUTOF10: 1
PAINLEVEL_OUTOF10: 4
PAINLEVEL_OUTOF10: 3

## 2018-08-28 NOTE — CARE PLAN
Problem: Pain Management  Goal: Pain level will decrease to patient's comfort goal  Outcome: PROGRESSING AS EXPECTED   08/28/18 1100   OTHER   Pain Scale 0 - 10  2   Comfort Goal Comfort at Rest;Comfort with Movement       Problem: Altered physiologic condition related to immediate post-delivery state and potential for bleeding/hemorrhage  Goal: Patient physiologically stable as evidenced by normal lochia, palpable uterine involution and vital signs within normal limits  Outcome: PROGRESSING AS EXPECTED  Patient is stable. Lochia light. Fundus firm. Vital signs are within defined limits.

## 2018-08-28 NOTE — DISCHARGE SUMMARY
DATE OF ADMISSION:  2018    DATE OF DISCHARGE:  2018    DISCHARGE DIAGNOSES:  1.  Status post spontaneous vaginal delivery.  2.  Uncomplicated postpartum course.    HISTORY OF PRESENT ILLNESS:  This is a 38-year-old woman  3, para 2 at   39-2/7 weeks gestation who presented to labor and delivery with complaints of   frequent regular painful uterine contractions.  Her cervix was found to be   3-4, complete, -2 and -3.  When her cervix was checked several hours later,   she was found to have changed to 5-6 cm, completely effaced, and -2 station.    The patient was declining a continuous lumbar epidural.  She was group B strep   negative and was subsequently admitted.    PAST MEDICAL HISTORY AND PHYSICAL EXAMINATION:  Can be found in dictated   history and physical.    ASSESSMENT AND PLAN:  A 38-year-old  3, para 2 at term with overall   reassuring fetal status, now entering to labor and delivery with admission and   plan to proceed forward with active labor.    HOSPITAL COURSE:  As stated above, the patient was admitted.  She did change   from as stated above, 3-4 cm to 5-6.  She did receive a continuous lumbar   epidural to optimize pain management.  She progressed well during the course   of her labor, subsequently underwent spontaneous vaginal delivery.  Apgars   were 8 and 9 at one and five minutes respectively.  The infant's weight was   4540 g, overall reassuring status was noted and the patient was subsequently   sent out to labor and delivery.  Her postpartum course was unremarkable and on   postpartum day #1, she was ambulating and voiding well, tolerating a regular   diet.  Her pain was well controlled.  She had very minimal lochia rubra.  She   is felt to be appropriate for discharge.    DISCHARGE PLAN:  To follow up in 6 weeks.    DISCHARGE INSTRUCTIONS:  She is to call with an increased temperature greater   than 100.4, increasing vaginal bleeding, abdominal pain unrelieved with  any   p.o. pain medication or call with any other questions or concerns.    DISCHARGE MEDICATIONS:  Motrin and Percocet.       ____________________________________     MD TIFFANIE FERREIRA / LUMA    DD:  08/28/2018 07:19:48  DT:  08/28/2018 07:31:12    D#:  [id number]  Job#:  [id number]

## 2018-08-28 NOTE — CONSULTS
"Lactation Note:    Met with MOB for an initial lactation visit.  MOB reported breast fed her first child for 3 years and her second child for almost 2 years.  MOB is currently in the NBN breastfeeding infant in cross cradle position.  Assistance offered with breastfeeding and MOB accepted.  MOB reported nipples are sore and right nipple is cut.  Unable to gain access to assess right nipple and breast due to position of infant in MOB's arms.  Shallow latch observed.  Instructed MOB to wait for infant to open her mouth wide before placing her nipple in infant's mouth.  Demonstrated to MOB on how to bring infant's mouth deeply onto breast once infant's mouth is open wide.  Latch successful.  MOB denied pain with latch.  Also, demonstrated to MOB on correct positioning of infant's mouth on areola (flared out vs curled under).  MOB also instructed to keep infant's head aligned at the breast and not allow infant to curl her head to help keep infant's airway opened.  Observed infant performing non-nutritive suckling at the breast.  Educated MOB that a good feed should last a minimum of 10-15 at both breasts combined with good jaw movement observed.  Informed MOB that she may also have to stimulate infant to suck in nutritive manner by rubbing infant's cheeks shoulders, or bottom of her feet or by removing infant from sleep sack.       Encouraged MOB to feed infant every 2-3 hours and advised MOB not allow infant to go more than 3 hours without a feed.      DAVID has [employer] health insurance and stated received a personal breast pump from her insurance company.  MOB stated does not have WIC.    MOB made aware of the outpatient lactation assistance available to her through the [facility].  Invited MOB to attend [facility] [place].    Provided lanolin cream for cracked and sore nipples along with instructions on use.    Provided \"Getting To Know Your Baby\" in MOB's preferred language of Wallisian.    MOB verbalized " understanding of all information provided to her and denied having any further questions at this time.  Encouraged MOB to call for lactation assistance as needed.

## 2018-08-28 NOTE — PROGRESS NOTES
notified if ok for patient to stay as her infant not discharge today. Telephone RBO made to discontinue discharge order. Patient updated and ok with POC.

## 2018-08-28 NOTE — PROGRESS NOTES
Assume care at 0715. Assessment complete. Fundus firm with light, rubra discharge. Ambulating to bathroom and voiding without difficulty. Patient denies any calf pain or tenderness at this time. Patient will call for pain medication as needed. Encouraged to call for assistance.  Plan of care discussed. Questions/concerns addressed at this time. No additional needs at this time.

## 2018-08-28 NOTE — PROGRESS NOTES
1900 Received bedside report from JEANCARLOS Lees RN. Discussed plan of care. Patient will call for pain medication as needed. All needs met at this time.

## 2018-08-28 NOTE — PROGRESS NOTES
Progress Note    Subjective:   Doing well. No issues or concerns. Pain well controlled. No sig lochia rubra    Objective Data:  Recent Labs      08/26/18   2045  08/28/18   0418   WBC  10.3  12.5*   RBC  4.47  3.87*   HEMOGLOBIN  12.6  10.5*   HEMATOCRIT  37.3  33.5*   MCV  83.4  86.6   MCH  28.2  27.1   MCHC  33.8  31.3*   RDW  47.0  50.1*   PLATELETCT  338  314   MPV  10.0  9.7           Vitals:    08/27/18 1200 08/27/18 1600 08/27/18 1714 08/27/18 2000   BP: (!) 91/65 103/60  109/75   Pulse: 91 63  71   Resp: 20 20  18   Temp: 36.7 °C (98 °F) 36.4 °C (97.6 °F)  36.8 °C (98.2 °F)   TempSrc:       SpO2: 96% 97% 95% 98%   Weight:       Height:         Abdomen: soft non tender fundus at umbilicus  Perineum: no sig bleeding on pad  Ext:Non tender calves    Intake/Output Summary (Last 24 hours) at 08/28/18 0720  Last data filed at 08/27/18 1415   Gross per 24 hour   Intake              125 ml   Output                0 ml   Net              125 ml       Current Facility-Administered Medications   Medication Dose Route Frequency Provider Last Rate Last Dose   • ondansetron (ZOFRAN ODT) dispertab 4 mg  4 mg Oral Q6HRS PRN Germán Mendosa M.D.        Or   • ondansetron (ZOFRAN) syringe/vial injection 4 mg  4 mg Intravenous Q6HRS PRN Germán Mendosa M.D.       • oxytocin (PITOCIN) infusion (for postpartum)   mL/hr Intravenous Continuous Germán Mendosa M.D. 125 mL/hr at 08/27/18 0610 125 mL/hr at 08/27/18 0610   • ibuprofen (MOTRIN) tablet 600 mg  600 mg Oral Q6HRS PRN Germán Mendosa M.D.   600 mg at 08/27/18 2120   • acetaminophen (TYLENOL) tablet 325 mg  325 mg Oral Q4HRS PRN Germán Mendosa M.D.   325 mg at 08/27/18 0606   • oxyCODONE-acetaminophen (PERCOCET) 5-325 MG per tablet 1 Tab  1 Tab Oral Q4HRS PRN Germán Mendosa M.D.       • oxyCODONE-acetaminophen (PERCOCET-10)  MG per tablet 1 Tab  1 Tab Oral Q4HRS PRN Germán Mendosa M.D.   1 Tab at 08/27/18 2120   • LR infusion   Intravenous PRSOLOMON DURAN  FLAVIA Mendosa       • PRN oxytocin (PITOCIN) (20 Units/1000 mL) PRN for excessive uterine bleeding - See Admin Instr  125-999 mL/hr Intravenous Once PRN Germán Mendosa M.D.       • methylergonovine (METHERGINE) injection 0.2 mg  0.2 mg Intramuscular Once PRN Germán Mendosa M.D.       • docusate sodium (COLACE) capsule 100 mg  100 mg Oral BID PRN Germán Mendosa M.D.       • prenatal plus vitamin (STUARTNATAL 1+1) 27-1 MG tablet 1 Tab  1 Tab Oral QAM Germán Mendosa M.D.   1 Tab at 18 1006       A/P S/P . Doing well. No issues or concerns. Pain well controlled. Plan to discharge home today with f/u in 6 weeks. Discharge instructions given.

## 2018-08-29 VITALS
TEMPERATURE: 97.5 F | HEIGHT: 65 IN | OXYGEN SATURATION: 95 % | BODY MASS INDEX: 40.82 KG/M2 | SYSTOLIC BLOOD PRESSURE: 109 MMHG | RESPIRATION RATE: 16 BRPM | HEART RATE: 72 BPM | DIASTOLIC BLOOD PRESSURE: 94 MMHG | WEIGHT: 245 LBS

## 2018-08-29 PROCEDURE — A9270 NON-COVERED ITEM OR SERVICE: HCPCS | Performed by: SPECIALIST

## 2018-08-29 PROCEDURE — 700102 HCHG RX REV CODE 250 W/ 637 OVERRIDE(OP): Performed by: SPECIALIST

## 2018-08-29 RX ADMIN — Medication 1 TABLET: at 08:42

## 2018-08-29 ASSESSMENT — PATIENT HEALTH QUESTIONNAIRE - PHQ9
2. FEELING DOWN, DEPRESSED, IRRITABLE, OR HOPELESS: NOT AT ALL
1. LITTLE INTEREST OR PLEASURE IN DOING THINGS: NOT AT ALL
SUM OF ALL RESPONSES TO PHQ9 QUESTIONS 1 AND 2: 0

## 2018-08-29 ASSESSMENT — PAIN SCALES - GENERAL
PAINLEVEL_OUTOF10: 3
PAINLEVEL_OUTOF10: 2
PAINLEVEL_OUTOF10: 0
PAINLEVEL_OUTOF10: 0
PAINLEVEL_OUTOF10: 3
PAINLEVEL_OUTOF10: 3
PAINLEVEL_OUTOF10: 2

## 2018-08-29 NOTE — PROGRESS NOTES
Progress Note    Subjective:   Doing well. No issues or concerns. Pain well controlled. Wants to go home.    Objective Data:  Recent Labs      08/26/18 2045  08/28/18   0418   WBC  10.3  12.5*   RBC  4.47  3.87*   HEMOGLOBIN  12.6  10.5*   HEMATOCRIT  37.3  33.5*   MCV  83.4  86.6   MCH  28.2  27.1   MCHC  33.8  31.3*   RDW  47.0  50.1*   PLATELETCT  338  314   MPV  10.0  9.7           Vitals:    08/27/18 2000 08/28/18 0800 08/28/18 2000 08/29/18 0758   BP: 109/75 108/79 104/72 109/94   Pulse: 71 71 88 72   Resp: 18 20 17 16   Temp: 36.8 °C (98.2 °F) 36.1 °C (97 °F) 36.4 °C (97.6 °F) 36.4 °C (97.5 °F)   TempSrc:       SpO2: 98% 95% 95% 95%   Weight:       Height:         Abdomen: soft non tender fundus  Perineum: no sig bleeding or discharge  Ext:Non tender calves    No intake or output data in the 24 hours ending 08/29/18 0808    Current Facility-Administered Medications   Medication Dose Route Frequency Provider Last Rate Last Dose   • ondansetron (ZOFRAN ODT) dispertab 4 mg  4 mg Oral Q6HRS PRN Germán Mendosa M.D.        Or   • ondansetron (ZOFRAN) syringe/vial injection 4 mg  4 mg Intravenous Q6HRS PRN Germán Mendosa M.D.       • oxytocin (PITOCIN) infusion (for postpartum)   mL/hr Intravenous Continuous Germán Mendosa M.D. 125 mL/hr at 08/27/18 0610 125 mL/hr at 08/27/18 0610   • ibuprofen (MOTRIN) tablet 600 mg  600 mg Oral Q6HRS PRN Germán Mendosa M.D.   600 mg at 08/28/18 2227   • acetaminophen (TYLENOL) tablet 325 mg  325 mg Oral Q4HRS PRN Germán Mendosa M.D.   325 mg at 08/27/18 0606   • oxyCODONE-acetaminophen (PERCOCET) 5-325 MG per tablet 1 Tab  1 Tab Oral Q4HRS PRN Germán Mendosa M.D.       • oxyCODONE-acetaminophen (PERCOCET-10)  MG per tablet 1 Tab  1 Tab Oral Q4HRS PRN Germán Mendosa M.D.   1 Tab at 08/28/18 2227   • LR infusion   Intravenous PRN Germán Mendosa M.D.       • PRN oxytocin (PITOCIN) (20 Units/1000 mL) PRN for excessive uterine bleeding - See Admin Instr   125-999 mL/hr Intravenous Once PRN Germán Mendosa M.D.       • methylergonovine (METHERGINE) injection 0.2 mg  0.2 mg Intramuscular Once PRN Germán Mendosa M.D.       • docusate sodium (COLACE) capsule 100 mg  100 mg Oral BID PRN Germán Mendosa M.D.   100 mg at 18 1621   • prenatal plus vitamin (STUARTNATAL 1+1) 27-1 MG tablet 1 Tab  1 Tab Oral PARK Mendosa M.D.   1 Tab at 18 0900       A/P S/P . Doing well. No issues or concerns. Plan to discharge home today. F/u 6 weeks.

## 2018-08-29 NOTE — PROGRESS NOTES
Report received, pt care assumed. Pt denies pain, baby cared in the nursery. Family in the room. All needs met at this time.

## 2018-08-29 NOTE — DISCHARGE INSTRUCTIONS
POSTPARTUM DISCHARGE INSTRUCTIONS FOR MOM    YOB: 1980   Age: 38 y.o.               Admit Date: 2018     Discharge Date: 2018  Attending Doctor:  Srinivasan Chase M.D.                  Allergies:  Patient has no known allergies.    Discharged to home by car. Discharged via wheelchair, hospital escort: Yes.  Special equipment needed: Not Applicable  Belongings with: Personal  Be sure to schedule a follow-up appointment with your primary care doctor or any specialists as instructed.     Discharge Plan:   Diet Plan: Discussed  Activity Level: Discussed  Confirmed Follow up Appointment: Patient to Call and Schedule Appointment  Confirmed Symptoms Management: Discussed  Medication Reconciliation Updated: Yes  Influenza Vaccine Indication: Patient Refuses    She is to call with an increased temperature greater than 100.4, increasing vaginal bleeding, abdominal pain unrelieved with any   oral pain medication or call with any other questions or concerns.     REASONS TO CALL YOUR OBSTETRICIAN:  1.   Persistent fever or shaking chills (Temperature higher than 100.4)  2.   Heavy bleeding (soaking more than 1 pad per hour); Passing clots  3.   Foul odor from vagina  4.   Mastitis (Breast infection; breast pain, chills, fever, redness)  5.   Urinary pain, burning or frequency  6.   Episiotomy infection  7.   Abdominal incision infection  8.   Severe depression longer than 24 hours    HAND WASHING  · Prior to handling the baby.  · Before breastfeeding or bottle feeding baby.  · After using the bathroom or changing the baby's diaper.    WOUND CARE  Ask your physician for additional care instructions.  In general:    ·  Incision:      · Keep clean and dry.    · Do NOT lift anything heavier than your baby for up to 6 weeks.    · There should not be any opening or pus.      VAGINAL CARE  · Nothing inside vagina for 6 weeks: no sexual intercourse, tampons or douching.  · Bleeding may continue for 2-4  "weeks.  Amount may vary.    · Call your physician for heavy bleeding which means soaking more than 1 pad per hour    BIRTH CONTROL  · It is possible to become pregnant at any time after delivery and while breastfeeding.  · Plan to discuss a method of birth control with your physician at your follow up visit. visit.    DIET AND ELIMINATION  · Eating more fiber (bran cereal, fruits, and vegetables) and drinking plenty of fluids will help to avoid constipation.  · Urinary frequency after childbirth is normal.    POSTPARTUM BLUES  During the first few days after birth, you may experience a sense of the \"blues\" which may include impatience, irritability or even crying.  These feeling come and go quickly.  However, as many as 1 in 10 women experience emotional symptoms known as postpartum depression.    Postpartum depression:  May start as early as the second or third day after delivery or take several weeks or months to develop.  Symptoms of \"blues\" are present, but are more intense:  Crying spells; loss of appetite; feelings of hopelessness or loss of control; fear of touching the baby; over concern or no concern at all about the baby; little or no concern about your own appearance/caring for yourself; and/or inability to sleep or excessive sleeping.  Contact your physician if you are experiencing any of these symptoms.    Crisis Hotline:  · [place] Crisis Hotline:  3-722-XYGXLSS  Or 1-793.909.5293  · [place] Crisis Hotline:  1-787.905.8483  Or 138-808-9055    PREVENTING SHAKEN BABY:  If you are angry or stressed, PUT THE BABY IN THE CRIB, step away, take some deep breaths, and wait until you are calm to care for the baby.  DO NOT SHAKE THE BABY.  You are not alone, call a supporter for help.    · [facility] 24/7 crisis line 159-413-1960 or 1-267.638.5268  · You can also text them, text \"ANSWER\" to 895117    QUIT SMOKING/TOBACCO USE:  I understand the use of any tobacco products increases my chance of suffering " from future heart disease and could cause other illnesses which may shorten my life. Quitting the use of tobacco products is the single most important thing I can do to improve my health. For further information on smoking / tobacco cessation call a Toll Free Quit Line at 1-111.430.4457 (*National Cancer [place]) or 1-592.316.6250 (American Lung Association) or you can access the web based program at www.lungusa.org.    · [place] Tobacco Users Help Line:  (936) 994-4349       Toll Free: 1-528.820.3694  · Quit Tobacco Program [facility] [place] operated by [facility] (701)228-3822    DEPRESSION / SUICIDE RISK:  As you are discharged from this [facility], it is important to learn how to keep safe from harming yourself.    Recognize the warning signs:  · Abrupt changes in personality, positive or negative- including increase in energy   · Giving away possessions  · Change in eating patterns- significant weight changes-  positive or negative  · Change in sleeping patterns- unable to sleep or sleeping all the time   · Unwillingness or inability to communicate  · Depression  · Unusual sadness, discouragement and loneliness  · Talk of wanting to die  · Neglect of personal appearance   · Rebelliousness- reckless behavior  · Withdrawal from people/activities they love  · Confusion- inability to concentrate     If you or a loved one observes any of these behaviors or has concerns about self-harm, here's what you can do:  · Talk about it- your feelings and reasons for harming yourself  · Remove any means that you might use to hurt yourself (examples: pills, rope, extension cords, firearm)  · Get professional help from the community (Mental Health, Substance Abuse, psychological counseling)  · Do not be alone:Call your Safe Contact- someone whom you trust who will be there for you.  · Call your local CRISIS HOTLINE 401-6496 or 782-387-9740  · Call your local Children's Mobile Crisis Response Team [facility] (429)  296-6936 or www.Tradier  · Call the toll free National Suicide Prevention Hotlines   · National Suicide Prevention Lifeline 918-609-UEPN (3412)  · National Hope Line Network 800-SUICIDE (776-3965)    DISCHARGE SURVEY:  Thank you for choosing [facility].  We hope we provided you with very good care.  You may be receiving a survey in the mail.  Please fill it out.  Your opinion is valuable to us.    ADDITIONAL EDUCATIONAL MATERIALS GIVEN TO PATIENT:        My signature on this form indicates that:  1.  I have reviewed and understand the above information  2.  My questions regarding this information have been answered to my satisfaction.  3.  I have formulated a plan with my discharge nurse to obtain my prescribed medication for home.

## 2018-08-29 NOTE — PROGRESS NOTES
Baby continues in NB nursery, mother will be discharged today. Discussed with mother getting breast pump through [place], sent in [facility] liaison for patient to sign-up. Debbie from [facility] reports mother refused WIC, due to unclear reason. Pump rental information given to mother to rent HG pump through [facility]. Reinforced importance of HG pump to protect milk supply. Mother will continue to pump every 3 hours, may have 5 hour rest period at night.

## 2018-08-29 NOTE — CARE PLAN
Problem: Infection  Goal: Will remain free from infection  Outcome: PROGRESSING AS EXPECTED  Afebrile. No s/s of infection noted. Ambulating.    Problem: Pain Management  Goal: Pain level will decrease to patient's comfort goal   08/29/18 0845   OTHER   Pain Scale 0 - 10  2   Comfort Goal Comfort at Rest;Comfort with Movement;Perform Activity

## 2018-08-29 NOTE — PROGRESS NOTES
Assume care at 0715. Assessment complete. Baby Level II in nursery. Ambulating to bathroom and voiding without difficulty. Patient will call for pain medication as needed. Encouraged to call for assistance.  Plan of care discussed. Discharge today. Questions/concerns addressed at this time. No additional needs at this time.

## 2018-08-31 NOTE — ADDENDUM NOTE
Encounter addended by: Orlin Fowler R.N. on: 8/30/2018  8:21 PM<BR>    Actions taken: Flowsheet accepted

## 2018-08-31 NOTE — ADDENDUM NOTE
Encounter addended by: Orlin Fowler R.N. on: 8/30/2018  9:27 PM<BR>    Actions taken: Flowsheet accepted

## 2018-09-01 ENCOUNTER — HOSPITAL ENCOUNTER (OUTPATIENT)
Facility: MEDICAL CENTER | Age: 38
End: 2018-09-03
Attending: HOSPITALIST | Admitting: HOSPITALIST
Payer: COMMERCIAL

## 2018-09-01 ENCOUNTER — HOSPITAL ENCOUNTER (EMERGENCY)
Facility: MEDICAL CENTER | Age: 38
End: 2018-09-01
Attending: EMERGENCY MEDICINE
Payer: COMMERCIAL

## 2018-09-01 ENCOUNTER — APPOINTMENT (OUTPATIENT)
Dept: RADIOLOGY | Facility: MEDICAL CENTER | Age: 38
End: 2018-09-01
Attending: EMERGENCY MEDICINE
Payer: COMMERCIAL

## 2018-09-01 ENCOUNTER — HOSPITAL ENCOUNTER (OUTPATIENT)
Facility: MEDICAL CENTER | Age: 38
End: 2018-09-01
Admitting: HOSPITALIST
Payer: COMMERCIAL

## 2018-09-01 VITALS
HEART RATE: 56 BPM | OXYGEN SATURATION: 98 % | BODY MASS INDEX: 39.67 KG/M2 | HEIGHT: 65 IN | WEIGHT: 238.1 LBS | DIASTOLIC BLOOD PRESSURE: 92 MMHG | RESPIRATION RATE: 18 BRPM | SYSTOLIC BLOOD PRESSURE: 156 MMHG | TEMPERATURE: 97 F

## 2018-09-01 VITALS — HEIGHT: 65 IN | WEIGHT: 238 LBS | BODY MASS INDEX: 39.65 KG/M2

## 2018-09-01 DIAGNOSIS — R60.9 PERIPHERAL EDEMA: ICD-10-CM

## 2018-09-01 DIAGNOSIS — R79.89 ELEVATED BRAIN NATRIURETIC PEPTIDE (BNP) LEVEL: ICD-10-CM

## 2018-09-01 PROBLEM — E66.9 OBESITY (BMI 30-39.9): Status: ACTIVE | Noted: 2018-09-01

## 2018-09-01 PROBLEM — D72.829 LEUKOCYTOSIS: Status: ACTIVE | Noted: 2018-09-01

## 2018-09-01 PROBLEM — R74.01 TRANSAMINITIS: Status: ACTIVE | Noted: 2018-09-01

## 2018-09-01 PROBLEM — I10 HYPERTENSION: Status: ACTIVE | Noted: 2018-09-01

## 2018-09-01 PROBLEM — R51.9 HEADACHE: Status: ACTIVE | Noted: 2018-09-01

## 2018-09-01 LAB
ALBUMIN SERPL BCP-MCNC: 3 G/DL (ref 3.2–4.9)
ALBUMIN/GLOB SERPL: 0.7 G/DL
ALP SERPL-CCNC: 157 U/L (ref 30–99)
ALT SERPL-CCNC: 58 U/L (ref 2–50)
ANION GAP SERPL CALC-SCNC: 10 MMOL/L (ref 0–11.9)
APPEARANCE UR: CLEAR
APTT PPP: 20.5 SEC (ref 24.7–36)
AST SERPL-CCNC: 28 U/L (ref 12–45)
BACTERIA #/AREA URNS HPF: ABNORMAL /HPF
BASOPHILS # BLD AUTO: 0.4 % (ref 0–1.8)
BASOPHILS # BLD: 0.05 K/UL (ref 0–0.12)
BILIRUB SERPL-MCNC: 0.3 MG/DL (ref 0.1–1.5)
BILIRUB UR QL STRIP.AUTO: NEGATIVE
BNP SERPL-MCNC: 131 PG/ML (ref 0–100)
BUN SERPL-MCNC: 15 MG/DL (ref 8–22)
CALCIUM SERPL-MCNC: 8.7 MG/DL (ref 8.4–10.2)
CHLORIDE SERPL-SCNC: 108 MMOL/L (ref 96–112)
CO2 SERPL-SCNC: 20 MMOL/L (ref 20–33)
COLOR UR: YELLOW
CREAT SERPL-MCNC: 0.56 MG/DL (ref 0.5–1.4)
EKG IMPRESSION: NORMAL
EOSINOPHIL # BLD AUTO: 0.18 K/UL (ref 0–0.51)
EOSINOPHIL NFR BLD: 1.6 % (ref 0–6.9)
ERYTHROCYTE [DISTWIDTH] IN BLOOD BY AUTOMATED COUNT: 50.3 FL (ref 35.9–50)
GLOBULIN SER CALC-MCNC: 4.1 G/DL (ref 1.9–3.5)
GLUCOSE SERPL-MCNC: 97 MG/DL (ref 65–99)
GLUCOSE UR STRIP.AUTO-MCNC: NEGATIVE MG/DL
HCT VFR BLD AUTO: 32.5 % (ref 37–47)
HGB BLD-MCNC: 10.5 G/DL (ref 12–16)
IMM GRANULOCYTES # BLD AUTO: 0.15 K/UL (ref 0–0.11)
IMM GRANULOCYTES NFR BLD AUTO: 1.3 % (ref 0–0.9)
INR PPP: 0.9 (ref 0.87–1.13)
KETONES UR STRIP.AUTO-MCNC: NEGATIVE MG/DL
LEUKOCYTE ESTERASE UR QL STRIP.AUTO: NEGATIVE
LIPASE SERPL-CCNC: 19 U/L (ref 7–58)
LYMPHOCYTES # BLD AUTO: 1.98 K/UL (ref 1–4.8)
LYMPHOCYTES NFR BLD: 17.7 % (ref 22–41)
MAGNESIUM SERPL-MCNC: 2 MG/DL (ref 1.5–2.5)
MCH RBC QN AUTO: 27.9 PG (ref 27–33)
MCHC RBC AUTO-ENTMCNC: 32.3 G/DL (ref 33.6–35)
MCV RBC AUTO: 86.4 FL (ref 81.4–97.8)
MICRO URNS: ABNORMAL
MONOCYTES # BLD AUTO: 0.77 K/UL (ref 0–0.85)
MONOCYTES NFR BLD AUTO: 6.9 % (ref 0–13.4)
MUCOUS THREADS #/AREA URNS HPF: ABNORMAL /HPF
NEUTROPHILS # BLD AUTO: 8.08 K/UL (ref 2–7.15)
NEUTROPHILS NFR BLD: 72.1 % (ref 44–72)
NITRITE UR QL STRIP.AUTO: NEGATIVE
NRBC # BLD AUTO: 0.02 K/UL
NRBC BLD-RTO: 0.2 /100 WBC
PH UR STRIP.AUTO: 5.5 [PH]
PLATELET # BLD AUTO: 385 K/UL (ref 164–446)
PMV BLD AUTO: 8.8 FL (ref 9–12.9)
POTASSIUM SERPL-SCNC: 4 MMOL/L (ref 3.6–5.5)
PROT SERPL-MCNC: 7.1 G/DL (ref 6–8.2)
PROT UR QL STRIP: NEGATIVE MG/DL
PROTHROMBIN TIME: 12.1 SEC (ref 12–14.6)
RBC # BLD AUTO: 3.76 M/UL (ref 4.2–5.4)
RBC # URNS HPF: ABNORMAL /HPF
RBC UR QL AUTO: ABNORMAL
SODIUM SERPL-SCNC: 138 MMOL/L (ref 135–145)
SP GR UR STRIP.AUTO: 1.01
TROPONIN I SERPL-MCNC: <0.02 NG/ML (ref 0–0.04)
TSH SERPL DL<=0.005 MIU/L-ACNC: 1.36 UIU/ML (ref 0.38–5.33)
TSH SERPL DL<=0.005 MIU/L-ACNC: 1.63 UIU/ML (ref 0.38–5.33)
UNIDENT CRYS URNS QL MICRO: ABNORMAL /HPF
URATE SERPL-MCNC: 6.2 MG/DL (ref 1.9–8.2)
WBC # BLD AUTO: 11.2 K/UL (ref 4.8–10.8)
WBC #/AREA URNS HPF: ABNORMAL /HPF

## 2018-09-01 PROCEDURE — 99220 PR INITIAL OBSERVATION CARE,LEVL III: CPT | Performed by: HOSPITALIST

## 2018-09-01 PROCEDURE — 85025 COMPLETE CBC W/AUTO DIFF WBC: CPT

## 2018-09-01 PROCEDURE — 99284 EMERGENCY DEPT VISIT MOD MDM: CPT

## 2018-09-01 PROCEDURE — 93005 ELECTROCARDIOGRAM TRACING: CPT

## 2018-09-01 PROCEDURE — 93005 ELECTROCARDIOGRAM TRACING: CPT | Performed by: EMERGENCY MEDICINE

## 2018-09-01 PROCEDURE — 85610 PROTHROMBIN TIME: CPT

## 2018-09-01 PROCEDURE — 84443 ASSAY THYROID STIM HORMONE: CPT | Mod: 91

## 2018-09-01 PROCEDURE — 80053 COMPREHEN METABOLIC PANEL: CPT

## 2018-09-01 PROCEDURE — 83690 ASSAY OF LIPASE: CPT

## 2018-09-01 PROCEDURE — 83880 ASSAY OF NATRIURETIC PEPTIDE: CPT

## 2018-09-01 PROCEDURE — G0378 HOSPITAL OBSERVATION PER HR: HCPCS

## 2018-09-01 PROCEDURE — 84550 ASSAY OF BLOOD/URIC ACID: CPT

## 2018-09-01 PROCEDURE — 84443 ASSAY THYROID STIM HORMONE: CPT

## 2018-09-01 PROCEDURE — 83735 ASSAY OF MAGNESIUM: CPT

## 2018-09-01 PROCEDURE — 36415 COLL VENOUS BLD VENIPUNCTURE: CPT

## 2018-09-01 PROCEDURE — 700102 HCHG RX REV CODE 250 W/ 637 OVERRIDE(OP): Performed by: HOSPITALIST

## 2018-09-01 PROCEDURE — 71045 X-RAY EXAM CHEST 1 VIEW: CPT

## 2018-09-01 PROCEDURE — 85730 THROMBOPLASTIN TIME PARTIAL: CPT

## 2018-09-01 PROCEDURE — A9270 NON-COVERED ITEM OR SERVICE: HCPCS | Performed by: HOSPITALIST

## 2018-09-01 PROCEDURE — 81001 URINALYSIS AUTO W/SCOPE: CPT

## 2018-09-01 PROCEDURE — 84484 ASSAY OF TROPONIN QUANT: CPT

## 2018-09-01 RX ORDER — ONDANSETRON 4 MG/1
4 TABLET, ORALLY DISINTEGRATING ORAL EVERY 4 HOURS PRN
Status: CANCELLED | OUTPATIENT
Start: 2018-09-01

## 2018-09-01 RX ORDER — ACETAMINOPHEN 325 MG/1
650 TABLET ORAL EVERY 6 HOURS PRN
Status: DISCONTINUED | OUTPATIENT
Start: 2018-09-01 | End: 2018-09-01 | Stop reason: HOSPADM

## 2018-09-01 RX ORDER — ONDANSETRON 4 MG/1
4 TABLET, ORALLY DISINTEGRATING ORAL EVERY 4 HOURS PRN
Status: DISCONTINUED | OUTPATIENT
Start: 2018-09-01 | End: 2018-09-01 | Stop reason: HOSPADM

## 2018-09-01 RX ORDER — ONDANSETRON 2 MG/ML
4 INJECTION INTRAMUSCULAR; INTRAVENOUS EVERY 4 HOURS PRN
Status: DISCONTINUED | OUTPATIENT
Start: 2018-09-01 | End: 2018-09-01 | Stop reason: HOSPADM

## 2018-09-01 RX ORDER — AMOXICILLIN 250 MG
2 CAPSULE ORAL 2 TIMES DAILY
Status: DISCONTINUED | OUTPATIENT
Start: 2018-09-01 | End: 2018-09-01 | Stop reason: HOSPADM

## 2018-09-01 RX ORDER — ONDANSETRON 4 MG/1
4 TABLET, ORALLY DISINTEGRATING ORAL EVERY 4 HOURS PRN
Status: DISCONTINUED | OUTPATIENT
Start: 2018-09-01 | End: 2018-09-03 | Stop reason: HOSPADM

## 2018-09-01 RX ORDER — PROMETHAZINE HYDROCHLORIDE 25 MG/1
12.5-25 SUPPOSITORY RECTAL EVERY 4 HOURS PRN
Status: DISCONTINUED | OUTPATIENT
Start: 2018-09-01 | End: 2018-09-01 | Stop reason: HOSPADM

## 2018-09-01 RX ORDER — ACETAMINOPHEN 325 MG/1
650 TABLET ORAL EVERY 6 HOURS PRN
Status: CANCELLED | OUTPATIENT
Start: 2018-09-01

## 2018-09-01 RX ORDER — HYDROCHLOROTHIAZIDE 25 MG/1
25 TABLET ORAL
Status: DISCONTINUED | OUTPATIENT
Start: 2018-09-01 | End: 2018-09-01 | Stop reason: HOSPADM

## 2018-09-01 RX ORDER — PROMETHAZINE HYDROCHLORIDE 25 MG/1
12.5-25 SUPPOSITORY RECTAL EVERY 4 HOURS PRN
Status: CANCELLED | OUTPATIENT
Start: 2018-09-01

## 2018-09-01 RX ORDER — PROMETHAZINE HYDROCHLORIDE 25 MG/1
12.5-25 TABLET ORAL EVERY 4 HOURS PRN
Status: CANCELLED | OUTPATIENT
Start: 2018-09-01

## 2018-09-01 RX ORDER — POLYETHYLENE GLYCOL 3350 17 G/17G
1 POWDER, FOR SOLUTION ORAL
Status: DISCONTINUED | OUTPATIENT
Start: 2018-09-01 | End: 2018-09-03 | Stop reason: HOSPADM

## 2018-09-01 RX ORDER — POLYETHYLENE GLYCOL 3350 17 G/17G
1 POWDER, FOR SOLUTION ORAL
Status: CANCELLED | OUTPATIENT
Start: 2018-09-01

## 2018-09-01 RX ORDER — AMOXICILLIN 250 MG
2 CAPSULE ORAL 2 TIMES DAILY
Status: CANCELLED | OUTPATIENT
Start: 2018-09-01

## 2018-09-01 RX ORDER — BISACODYL 10 MG
10 SUPPOSITORY, RECTAL RECTAL
Status: CANCELLED | OUTPATIENT
Start: 2018-09-01

## 2018-09-01 RX ORDER — BISACODYL 10 MG
10 SUPPOSITORY, RECTAL RECTAL
Status: DISCONTINUED | OUTPATIENT
Start: 2018-09-01 | End: 2018-09-03 | Stop reason: HOSPADM

## 2018-09-01 RX ORDER — POLYETHYLENE GLYCOL 3350 17 G/17G
1 POWDER, FOR SOLUTION ORAL
Status: DISCONTINUED | OUTPATIENT
Start: 2018-09-01 | End: 2018-09-01 | Stop reason: HOSPADM

## 2018-09-01 RX ORDER — LABETALOL HYDROCHLORIDE 5 MG/ML
10 INJECTION, SOLUTION INTRAVENOUS EVERY 4 HOURS PRN
Status: DISCONTINUED | OUTPATIENT
Start: 2018-09-01 | End: 2018-09-01 | Stop reason: HOSPADM

## 2018-09-01 RX ORDER — PROMETHAZINE HYDROCHLORIDE 25 MG/1
12.5-25 SUPPOSITORY RECTAL EVERY 4 HOURS PRN
Status: DISCONTINUED | OUTPATIENT
Start: 2018-09-01 | End: 2018-09-03 | Stop reason: HOSPADM

## 2018-09-01 RX ORDER — BISACODYL 10 MG
10 SUPPOSITORY, RECTAL RECTAL
Status: DISCONTINUED | OUTPATIENT
Start: 2018-09-01 | End: 2018-09-01 | Stop reason: HOSPADM

## 2018-09-01 RX ORDER — ONDANSETRON 2 MG/ML
4 INJECTION INTRAMUSCULAR; INTRAVENOUS EVERY 4 HOURS PRN
Status: DISCONTINUED | OUTPATIENT
Start: 2018-09-01 | End: 2018-09-03 | Stop reason: HOSPADM

## 2018-09-01 RX ORDER — LABETALOL HYDROCHLORIDE 5 MG/ML
10 INJECTION, SOLUTION INTRAVENOUS EVERY 4 HOURS PRN
Status: DISCONTINUED | OUTPATIENT
Start: 2018-09-01 | End: 2018-09-03 | Stop reason: HOSPADM

## 2018-09-01 RX ORDER — PROMETHAZINE HYDROCHLORIDE 25 MG/1
12.5-25 TABLET ORAL EVERY 4 HOURS PRN
Status: DISCONTINUED | OUTPATIENT
Start: 2018-09-01 | End: 2018-09-01 | Stop reason: HOSPADM

## 2018-09-01 RX ORDER — ONDANSETRON 2 MG/ML
4 INJECTION INTRAMUSCULAR; INTRAVENOUS EVERY 4 HOURS PRN
Status: CANCELLED | OUTPATIENT
Start: 2018-09-01

## 2018-09-01 RX ORDER — AMOXICILLIN 250 MG
2 CAPSULE ORAL 2 TIMES DAILY
Status: DISCONTINUED | OUTPATIENT
Start: 2018-09-01 | End: 2018-09-03 | Stop reason: HOSPADM

## 2018-09-01 RX ORDER — ACETAMINOPHEN 325 MG/1
650 TABLET ORAL EVERY 6 HOURS PRN
Status: DISCONTINUED | OUTPATIENT
Start: 2018-09-01 | End: 2018-09-03 | Stop reason: HOSPADM

## 2018-09-01 RX ORDER — LABETALOL HYDROCHLORIDE 5 MG/ML
10 INJECTION, SOLUTION INTRAVENOUS EVERY 4 HOURS PRN
Status: CANCELLED | OUTPATIENT
Start: 2018-09-01

## 2018-09-01 RX ORDER — PROMETHAZINE HYDROCHLORIDE 25 MG/1
12.5-25 TABLET ORAL EVERY 4 HOURS PRN
Status: DISCONTINUED | OUTPATIENT
Start: 2018-09-01 | End: 2018-09-03 | Stop reason: HOSPADM

## 2018-09-01 RX ADMIN — ACETAMINOPHEN 650 MG: 325 TABLET, FILM COATED ORAL at 18:43

## 2018-09-01 RX ADMIN — Medication 2 TABLET: at 18:43

## 2018-09-01 ASSESSMENT — COGNITIVE AND FUNCTIONAL STATUS - GENERAL
DAILY ACTIVITIY SCORE: 24
SUGGESTED CMS G CODE MODIFIER MOBILITY: CH
SUGGESTED CMS G CODE MODIFIER DAILY ACTIVITY: CH
MOBILITY SCORE: 24

## 2018-09-01 ASSESSMENT — ENCOUNTER SYMPTOMS
LOSS OF CONSCIOUSNESS: 0
EYE PAIN: 0
COUGH: 0
SPUTUM PRODUCTION: 0
BACK PAIN: 0
NECK PAIN: 0
PALPITATIONS: 0
MYALGIAS: 0
EYE DISCHARGE: 0
HEMOPTYSIS: 0
CONSTIPATION: 0
HEADACHES: 1
BRUISES/BLEEDS EASILY: 0
DIAPHORESIS: 0
WEAKNESS: 0
FOCAL WEAKNESS: 0
DEPRESSION: 0
DIZZINESS: 0
WHEEZING: 0
SHORTNESS OF BREATH: 0
VOMITING: 0
ABDOMINAL PAIN: 0
DIARRHEA: 0
FEVER: 0
CLAUDICATION: 0
NAUSEA: 0
SENSORY CHANGE: 0
CHILLS: 0
SPEECH CHANGE: 0
SORE THROAT: 0

## 2018-09-01 ASSESSMENT — PAIN SCALES - GENERAL
PAINLEVEL_OUTOF10: 4
PAINLEVEL_OUTOF10: 9
PAINLEVEL_OUTOF10: 9

## 2018-09-01 ASSESSMENT — LIFESTYLE VARIABLES
SUBSTANCE_ABUSE: 0
EVER_SMOKED: NEVER

## 2018-09-01 NOTE — ED NOTES
"Three attempts to cath pt.  Pt swollen and very tender, stated delivered a \"10 pound baby after all night of abd cramps.\"  Pt cleaned and new linens provided.  Urine collected and sent to lab  "

## 2018-09-01 NOTE — H&P
"Hospital Medicine History & Physical Note    Date of Service  2018    Primary Care Physician  Antoine Atlman M.D.    Consultants  Dr. Chase    Code Status  Full code    Chief Complaint  Edema legs    History of Presenting Illness  38 y.o. female who presented 2018 with history of recent vaginal delivery  at 39 weeks 2 days on 2018 patient of Dr. Chase. She was group B strep negative no complications postpartum. The patient states she last had a bowel movement yesterday normal she's been eating. She does complain of vague headache she says she had this headache all throughout her pregnancy as well. She also describes heavy load shift today 5 pads. She states she has had edema of her lower legs since delivery of her baby on 2018. The  and patient stated that the edema has actually gone down since delivery.  The patient and  state that her blood pressure have been elevated throughout her pregnancy however they were told that her blood pressure was normal. I do not have records of her blood pressure during pregnancy.\" Her baby is still in the NICU on 3rd floor serial at [facility] respiratory difficulties while feeding. Her last pregnancy was 15 years ago. She does not drink alcohol or smoke cigarettes. She had no complications with her pregnancy 15 years ago.    Review of Systems  Review of Systems   Constitutional: Negative for chills, diaphoresis, fever and malaise/fatigue.   HENT: Positive for ear pain. Negative for congestion and sore throat.    Eyes: Negative for pain and discharge.   Respiratory: Negative for cough, hemoptysis, sputum production, shortness of breath and wheezing.    Cardiovascular: Positive for chest pain and leg swelling. Negative for palpitations and claudication.   Gastrointestinal: Negative for abdominal pain, constipation, diarrhea, melena, nausea and vomiting.   Genitourinary: Negative for dysuria, frequency and urgency.   Musculoskeletal: Negative for " back pain, joint pain, myalgias and neck pain.   Skin: Negative for itching and rash.   Neurological: Positive for headaches. Negative for dizziness, sensory change, speech change, focal weakness, loss of consciousness and weakness.   Endo/Heme/Allergies: Does not bruise/bleed easily.   Psychiatric/Behavioral: Negative for depression, substance abuse and suicidal ideas.       Past Medical History   has a past medical history of  (normal spontaneous vaginal delivery) (2018).    Surgical History   has no past surgical history on file.     Family History  family history includes No Known Problems in her father and mother.     Social History   reports that she has never smoked. She has never used smokeless tobacco. She reports that she does not drink alcohol or use drugs.    Allergies  No Known Allergies    Medications  Prior to Admission Medications   Prescriptions Last Dose Informant Patient Reported? Taking?   Prenatal Multivit-Min-Fe-FA (PRENATAL VITAMINS PO) 2018 at Unknown time Patient Yes No   Sig: Take 1 Tab by mouth every day.   ibuprofen (MOTRIN) 600 MG Tab 2018 at 0800 Patient No No   Sig: Take 1 Tab by mouth every 6 hours as needed (For cramping after delivery; do not give if patient is receiving ketorolac (Toradol)).   oxyCODONE-acetaminophen (PERCOCET) 5-325 MG Tab 2018 at 2300 Patient No No   Sig: Take 1 Tab by mouth every four hours as needed for up to 7 days.      Facility-Administered Medications: None       Physical Exam  Blood Pressure: 156/92   Temperature: 36.1 °C (97 °F)   Pulse: (!) 56   Respiration: 18   Pulse Oximetry: 96 %     Physical Exam   Constitutional: She is oriented to person, place, and time. She appears well-developed and well-nourished. No distress.   Nontoxic appearance. Answered all questions appropriately.   HENT:   Head: Normocephalic and atraumatic.   Right Ear: External ear normal.   Left Ear: External ear normal.   Nose: Nose normal.   Mouth/Throat:  Oropharynx is clear and moist. No oropharyngeal exudate.   Bilateral tympanic membranes examined and normal.   Eyes: Pupils are equal, round, and reactive to light. Conjunctivae and EOM are normal. Right eye exhibits no discharge. Left eye exhibits no discharge. No scleral icterus.   Neck: Normal range of motion. Neck supple. No JVD present. No tracheal deviation present. No thyromegaly present.   Cardiovascular: Normal rate, regular rhythm, normal heart sounds and intact distal pulses.  Exam reveals no gallop and no friction rub.    No murmur heard.  Pulmonary/Chest: Effort normal and breath sounds normal. No stridor. No respiratory distress. She has no wheezes. She has no rales. She exhibits no tenderness.   Abdominal: Soft. Bowel sounds are normal. She exhibits no distension and no mass. There is no tenderness. There is no rebound and no guarding.   Musculoskeletal: Normal range of motion. She exhibits edema (1-2+ edema noted bilateral ankles. Normal DTRs.). She exhibits no tenderness.   Lymphadenopathy:     She has no cervical adenopathy.   Neurological: She is alert and oriented to person, place, and time. No cranial nerve deficit. She exhibits normal muscle tone. Coordination normal.   Skin: Skin is warm and dry. No rash noted. She is not diaphoretic. No erythema.   Psychiatric: She has a normal mood and affect. Her behavior is normal. Judgment and thought content normal.   Nursing note and vitals reviewed.      Laboratory:  Recent Labs      09/01/18   1405   WBC  11.2*   RBC  3.76*   HEMOGLOBIN  10.5*   HEMATOCRIT  32.5*   MCV  86.4   MCH  27.9   MCHC  32.3*   RDW  50.3*   PLATELETCT  385   MPV  8.8*     Recent Labs      09/01/18   1405   SODIUM  138   POTASSIUM  4.0   CHLORIDE  108   CO2  20   GLUCOSE  97   BUN  15   CREATININE  0.56   CALCIUM  8.7     Recent Labs      09/01/18   1405   ALTSGPT  58*   ASTSGOT  28   ALKPHOSPHAT  157*   TBILIRUBIN  0.3   LIPASE  19   GLUCOSE  97     Recent Labs      09/01/18    1405   APTT  20.5*   INR  0.90     Recent Labs      09/01/18   1405   BNPBTYPENAT  131*         Lab Results   Component Value Date    TROPONINI <0.02 09/01/2018       Urinalysis:    Recent Labs      09/01/18   1505   SPECGRAVITY  1.010   GLUCOSEUR  Negative   KETONES  Negative   NITRITE  Negative   LEUKESTERAS  Negative        Imaging:  DX-CHEST-PORTABLE (1 VIEW)   Final Result      No acute cardiac or pulmonary abnormalities are identified.      LE VENOUS DUPLEX - DVT ([facility] Only)    (Results Pending)   ECHOCARDIOGRAM COMP W/O CONT    (Results Pending)         Assessment/Plan:  I anticipate this patient is appropriate for observation status at this time.    Headache- (present on admission)   Assessment & Plan    Patient states she's had a headache throughout her pregnancy off and on. She does not appear to be in acute distress. She was complaining of ear fullness however her bilateral TMs are examined and normal. She does not have any sinus tenderness with palpation. She states that her headache is all over. But does point to her forehead.  MRI brain w/o ordered.        Leukocytosis- (present on admission)   Assessment & Plan    Mild elevation of white count 11.2. UA is negative for infection chest x-ray negative likely patient has reactive leukocytosis from pregnancy        Transaminitis- (present on admission)   Assessment & Plan    Elevated ALT 58 upper quadrant. She is a non-alcohol drinker. Repeat in a.m.        Hypertension- (present on admission)   Assessment & Plan    Patient has hypertension 156/92 with repeat the same. Heart rate is 56 so no beta blockers. Likely patient could benefit from a blood pressure diuretic. Will await Dr. velasco recommendations for blood pressure medication. Otherwise would recommend hydrochlorothiazide 25 mg daily.        Obesity (BMI 30-39.9)- (present on admission)   Assessment & Plan    Patient has a BMI of 39.6. Outpatient weight loss and exercise  recommended.        Edema- (present on admission)   Assessment & Plan    Patient with lower extremity edema noted. The patient has been actually state that they have gone down since delivery over the last 3 days. She does have normal DTRs on exam and no proteinuria however she does have some mild transaminitis ALT 58.  I have ordered ultrasounds of the lower extremities as well as echocardiogram. Patient's EKG is normal sinus bradycardia at 58 no ST elevations or depressions.            VTE prophylaxis: scds

## 2018-09-01 NOTE — PROGRESS NOTES
Patient is a direct admit from [facility], patient of Dr. Martin's for edema and hypertension.  Dr. Martin is admitting the patient.  ADT order signed and held in [facility] on 9/1 by MD.  Held orders to be released upon patients arrival to unit.

## 2018-09-01 NOTE — ED PROVIDER NOTES
"ED Provider Note    CHIEF COMPLAINT  Chief Complaint   Patient presents with   • Leg Swelling       HPI  Therese Stephens is a 38 y.o. female who presents with leg swelling and shortness of breath.  The patient is status post a vaginal delivery on Monday.  She is a .  She states that she was swollen in the hospital was gotten progressively worse.  She denies any calf pain although her legs are sore because they are so swollen.  She denies any change in bowel or bladder.  The patient is now developing shortness of breath as well.  She denies any fever.  No chest pain.  Baby is still in hospital.  There is no other complaint.    PAST MEDICAL HISTORY  None    FAMILY HISTORY  Family History   Problem Relation Age of Onset   • Hypertension Neg Hx    • Heart Disease Neg Hx    • Stroke Neg Hx    • Cancer Neg Hx        SOCIAL HISTORY  Social History   Substance Use Topics   • Smoking status: Never Smoker   • Smokeless tobacco: Never Used   • Alcohol use No         SURGICAL HISTORY  History reviewed. No pertinent surgical history.    CURRENT MEDICATIONS  Home Medications     Reviewed by Ana M Garsia (Pharmacy Tech) on 18 at 1352  Med List Status: Complete   Medication Last Dose Status   ibuprofen (MOTRIN) 600 MG Tab 2018 Active   oxyCODONE-acetaminophen (PERCOCET) 5-325 MG Tab 2018 Active   Prenatal Multivit-Min-Fe-FA (PRENATAL VITAMINS PO) 2018 Active                I have reviewed the nurses notes and/or the list brought with the patient.    ALLERGIES  No Known Allergies    REVIEW OF SYSTEMS  See HPI for further details. Review of systems as above, otherwise all other systems are negative.     PHYSICAL EXAM  VITAL SIGNS: /92   Pulse (!) 57   Temp 36.1 °C (97 °F)   Resp 18   Ht 1.651 m (5' 5\")   Wt 108 kg (238 lb 1.6 oz)   LMP 2017   SpO2 96%   Breastfeeding? Unknown   BMI 39.62 kg/m²     Constitutional: Well appearing patient in no acute distress.  Not toxic, nor ill " in appearance.  HENT: Mucus membranes moist.  Oropharynx is clear.  Eyes: Pupils equally round.  No scleral icterus.   Neck: Full nontender range of motion.  Lymphatic: No cervical lymphadenopathy noted.   Cardiovascular: Regular rate and rhythm.  No obvious murmur, however heart sounds distant.  Thorax & Lungs: Chest is nontender.  Lungs are grossly clear but breath sounds are distant.  Abdomen: Soft, with no tenderness, rebound nor guarding.  No mass, pulsatile mass, nor hepatosplenomegaly appreciated.  Skin: No purpura nor petechia noted.  Extremities/Musculoskeletal: No sign of trauma.  Calves are nontender with no cords.  She has significant edema extending from her feet up to her thighs.  This is symmetric.  No Bailey's sign.  Pulses are intact all around.   Neurologic: Alert & oriented.  Strength and sensation is intact all around.  Gait is normal.  Psychiatric: Normal affect appropriate for the clinical situation.    EKG  I interpreted this EKG myself.  This is a 12-lead study.  The rhythm is sinus with a rate of 58.  There are no ST segment nor T wave abnormalities.  Interpretation: No ST segment elevation myocardial infarction.    LABS  Labs Reviewed   CBC WITH DIFFERENTIAL - Abnormal; Notable for the following:        Result Value    WBC 11.2 (*)     RBC 3.76 (*)     Hemoglobin 10.5 (*)     Hematocrit 32.5 (*)     MCHC 32.3 (*)     RDW 50.3 (*)     MPV 8.8 (*)     Neutrophils-Polys 72.10 (*)     Lymphocytes 17.70 (*)     Immature Granulocytes 1.30 (*)     Neutrophils (Absolute) 8.08 (*)     Immature Granulocytes (abs) 0.15 (*)     All other components within normal limits    Narrative:     Indicate which anticoagulants the patient is on:->UNKNOWN   COMP METABOLIC PANEL - Abnormal; Notable for the following:     ALT(SGPT) 58 (*)     Alkaline Phosphatase 157 (*)     Albumin 3.0 (*)     Globulin 4.1 (*)     All other components within normal limits    Narrative:     Indicate which anticoagulants the patient  is on:->UNKNOWN   BTYPE NATRIURETIC PEPTIDE - Abnormal; Notable for the following:     B Natriuretic Peptide 131 (*)     All other components within normal limits    Narrative:     Indicate which anticoagulants the patient is on:->UNKNOWN   APTT - Abnormal; Notable for the following:     APTT 20.5 (*)     All other components within normal limits    Narrative:     Indicate which anticoagulants the patient is on:->UNKNOWN   URINALYSIS,CULTURE IF INDICATED - Abnormal; Notable for the following:     Occult Blood Small (*)     All other components within normal limits   LIPASE    Narrative:     Indicate which anticoagulants the patient is on:->UNKNOWN   TROPONIN    Narrative:     Indicate which anticoagulants the patient is on:->UNKNOWN   PROTHROMBIN TIME    Narrative:     Indicate which anticoagulants the patient is on:->UNKNOWN   MAGNESIUM    Narrative:     Indicate which anticoagulants the patient is on:->UNKNOWN   ESTIMATED GFR    Narrative:     Indicate which anticoagulants the patient is on:->UNKNOWN   URINE MICROSCOPIC (W/UA)         RADIOLOGY/PROCEDURES  I have reviewed the patient's film interpretations myself, and they are read out by the radiologist as:   DX-CHEST-PORTABLE (1 VIEW)   Final Result      No acute cardiac or pulmonary abnormalities are identified.        .    MEDICAL RECORD  I have reviewed patient's medical record and pertinent results are listed above.    COURSE & MEDICAL DECISION MAKING  I have reviewed any medical record information, laboratory studies and radiographic results as noted above.  This patient presents with peripheral edema, progressively worsening as well as shortness of breath now.  On exam she is initially quite hypertensive.  She has had a few normal blood pressures but does have some persistent elevated ones as well.  She does have an elevated BNP.  Her EKG is nondiagnostic, first troponin is negative.  She does have a mild elevation of AST however ALT is normal.  I do not  have a baseline for comparison.  There is some difficulty obtaining a urinalysis given edema in the perineum, however this was ultimately obtained and does not show any evidence of proteinuria.  She has normal platelets.  She has no symptom of headache.  There is no anders pulmonary edema on her chest x-ray, although as noted her BNP is elevated.  Given that her blood pressure is normalized, I did not give her treatment at this point.  However I did discuss patient's case with Dr. Chase, her gynecologist.  I would like to transfer her downtown for an ongoing medical evaluation to include observation following her pressures and laboratories (with intervention is necessary), potentially echocardiogram, and gynecology consultation.  I spoke with Dr. Briones, the [facility] hospitalist, to help arrange a transfer for this, she is presently seeing the patient.  This is discussed with the patient and her  who verbalized understanding and expressed agreement.      FINAL IMPRESSION  1. Peripheral edema    2. Elevated brain natriuretic peptide (BNP) level    3. Postpartum hypertension           This dictation was created using voice recognition software.    Electronically signed by: Xavi Nieves, 9/1/2018 2:51 PM

## 2018-09-01 NOTE — ED NOTES
Pt presents after her child delivery, this past Monday.  She C/O progressing peripheral edema, and generalized malaise.

## 2018-09-01 NOTE — ASSESSMENT & PLAN NOTE
Patient with lower extremity edema noted. The patient has been actually state that they have gone down since delivery over the last 3 days. She does have normal DTRs on exam and no proteinuria however she does have some mild transaminitis ALT 58.  I have ordered ultrasounds of the lower extremities as well as echocardiogram. Patient's EKG is normal sinus bradycardia at 58 no ST elevations or depressions.

## 2018-09-01 NOTE — ASSESSMENT & PLAN NOTE
Patient has hypertension 156/92 with repeat the same. Heart rate is 56 so no beta blockers. Likely patient could benefit from a blood pressure diuretic. Will await Dr. velasco recommendations for blood pressure medication. Otherwise would recommend hydrochlorothiazide 25 mg daily.

## 2018-09-01 NOTE — ED NOTES
The Medication Reconciliation process has been completed by interviewing the patient    Allergies have been reviewed  Antibiotic use in 30 days - none    Home Pharmacy:  [facility] - [place]

## 2018-09-01 NOTE — ED NOTES
Pt and spouse aware will be transferred to [facility] via [facility].  Pt verbalized understanding.  Repositioned on gurney for comfort.

## 2018-09-01 NOTE — ASSESSMENT & PLAN NOTE
Mild elevation of white count 11.2. UA is negative for infection chest x-ray negative likely patient has reactive leukocytosis from pregnancy

## 2018-09-01 NOTE — ASSESSMENT & PLAN NOTE
Patient states she's had a headache throughout her pregnancy off and on. She does not appear to be in acute distress. She was complaining of ear fullness however her bilateral TMs are examined and normal. She does not have any sinus tenderness with palpation. She states that her headache is all over. But does point to her forehead.  MRI brain w/o ordered.

## 2018-09-02 ENCOUNTER — APPOINTMENT (OUTPATIENT)
Dept: RADIOLOGY | Facility: MEDICAL CENTER | Age: 38
End: 2018-09-02
Attending: INTERNAL MEDICINE
Payer: COMMERCIAL

## 2018-09-02 LAB
ALBUMIN SERPL BCP-MCNC: 3.1 G/DL (ref 3.2–4.9)
ALBUMIN/GLOB SERPL: 1 G/DL
ALP SERPL-CCNC: 149 U/L (ref 30–99)
ALT SERPL-CCNC: 42 U/L (ref 2–50)
ANION GAP SERPL CALC-SCNC: 9 MMOL/L (ref 0–11.9)
AST SERPL-CCNC: 18 U/L (ref 12–45)
BASOPHILS # BLD AUTO: 0.5 % (ref 0–1.8)
BASOPHILS # BLD: 0.05 K/UL (ref 0–0.12)
BILIRUB SERPL-MCNC: 0.4 MG/DL (ref 0.1–1.5)
BUN SERPL-MCNC: 13 MG/DL (ref 8–22)
CALCIUM SERPL-MCNC: 8.6 MG/DL (ref 8.5–10.5)
CHLORIDE SERPL-SCNC: 109 MMOL/L (ref 96–112)
CO2 SERPL-SCNC: 22 MMOL/L (ref 20–33)
CREAT SERPL-MCNC: 0.71 MG/DL (ref 0.5–1.4)
EOSINOPHIL # BLD AUTO: 0.13 K/UL (ref 0–0.51)
EOSINOPHIL NFR BLD: 1.3 % (ref 0–6.9)
ERYTHROCYTE [DISTWIDTH] IN BLOOD BY AUTOMATED COUNT: 50.3 FL (ref 35.9–50)
GLOBULIN SER CALC-MCNC: 3.1 G/DL (ref 1.9–3.5)
GLUCOSE SERPL-MCNC: 96 MG/DL (ref 65–99)
HCT VFR BLD AUTO: 31.5 % (ref 37–47)
HGB BLD-MCNC: 9.9 G/DL (ref 12–16)
IMM GRANULOCYTES # BLD AUTO: 0.16 K/UL (ref 0–0.11)
IMM GRANULOCYTES NFR BLD AUTO: 1.6 % (ref 0–0.9)
LV EJECT FRACT  99904: 60
LV EJECT FRACT MOD 2C 99903: 57.23
LV EJECT FRACT MOD 4C 99902: 64.05
LV EJECT FRACT MOD BP 99901: 60.56
LYMPHOCYTES # BLD AUTO: 1.97 K/UL (ref 1–4.8)
LYMPHOCYTES NFR BLD: 19.4 % (ref 22–41)
MCH RBC QN AUTO: 27.3 PG (ref 27–33)
MCHC RBC AUTO-ENTMCNC: 31.4 G/DL (ref 33.6–35)
MCV RBC AUTO: 87 FL (ref 81.4–97.8)
MONOCYTES # BLD AUTO: 0.78 K/UL (ref 0–0.85)
MONOCYTES NFR BLD AUTO: 7.7 % (ref 0–13.4)
NEUTROPHILS # BLD AUTO: 7.05 K/UL (ref 2–7.15)
NEUTROPHILS NFR BLD: 69.5 % (ref 44–72)
NRBC # BLD AUTO: 0.07 K/UL
NRBC BLD-RTO: 0.7 /100 WBC
PLATELET # BLD AUTO: 394 K/UL (ref 164–446)
PMV BLD AUTO: 9 FL (ref 9–12.9)
POTASSIUM SERPL-SCNC: 4 MMOL/L (ref 3.6–5.5)
PROT SERPL-MCNC: 6.2 G/DL (ref 6–8.2)
RBC # BLD AUTO: 3.62 M/UL (ref 4.2–5.4)
SODIUM SERPL-SCNC: 140 MMOL/L (ref 135–145)
WBC # BLD AUTO: 10.1 K/UL (ref 4.8–10.8)

## 2018-09-02 PROCEDURE — A9270 NON-COVERED ITEM OR SERVICE: HCPCS | Performed by: INTERNAL MEDICINE

## 2018-09-02 PROCEDURE — 99226 PR SUBSEQUENT OBSERVATION CARE,LEVEL III: CPT | Performed by: INTERNAL MEDICINE

## 2018-09-02 PROCEDURE — G0378 HOSPITAL OBSERVATION PER HR: HCPCS

## 2018-09-02 PROCEDURE — 93306 TTE W/DOPPLER COMPLETE: CPT | Mod: 26 | Performed by: INTERNAL MEDICINE

## 2018-09-02 PROCEDURE — 85025 COMPLETE CBC W/AUTO DIFF WBC: CPT

## 2018-09-02 PROCEDURE — 70551 MRI BRAIN STEM W/O DYE: CPT

## 2018-09-02 PROCEDURE — 80053 COMPREHEN METABOLIC PANEL: CPT

## 2018-09-02 PROCEDURE — 36415 COLL VENOUS BLD VENIPUNCTURE: CPT

## 2018-09-02 PROCEDURE — 700102 HCHG RX REV CODE 250 W/ 637 OVERRIDE(OP): Performed by: INTERNAL MEDICINE

## 2018-09-02 PROCEDURE — 93306 TTE W/DOPPLER COMPLETE: CPT

## 2018-09-02 RX ORDER — HYDROCHLOROTHIAZIDE 25 MG/1
12.5 TABLET ORAL
Status: DISCONTINUED | OUTPATIENT
Start: 2018-09-02 | End: 2018-09-03 | Stop reason: HOSPADM

## 2018-09-02 RX ADMIN — HYDROCHLOROTHIAZIDE 12.5 MG: 25 TABLET ORAL at 10:52

## 2018-09-02 ASSESSMENT — ENCOUNTER SYMPTOMS
NECK PAIN: 0
CHILLS: 0
HEADACHES: 1
MYALGIAS: 0
SHORTNESS OF BREATH: 0
BLURRED VISION: 0
PALPITATIONS: 0
COUGH: 0
VOMITING: 0
FEVER: 0
NAUSEA: 0

## 2018-09-02 ASSESSMENT — PAIN SCALES - GENERAL
PAINLEVEL_OUTOF10: 0
PAINLEVEL_OUTOF10: 2
PAINLEVEL_OUTOF10: 0
PAINLEVEL_OUTOF10: 0

## 2018-09-02 NOTE — PROGRESS NOTES
[facility] Hospitalist Progress Note    Date of Service: 2018    Chief Complaint  38 y.o. female admitted 2018 with lower extremity edema and headache    Interval Problem Update  Pt seen and examined, afebrile, she has just given birth on 18. Pt states HA has resolved, no nausea or vomiting. OB/GYN has also been consulted appreciate rec.      Consultants/Specialty  OB/GYN: Dr. Chase    Disposition  Home when medically cleared         Review of Systems   Constitutional: Negative for chills and fever.   Eyes: Negative for blurred vision.   Respiratory: Negative for cough and shortness of breath.    Cardiovascular: Negative for chest pain and palpitations.   Gastrointestinal: Negative for nausea and vomiting.   Genitourinary: Negative for dysuria.   Musculoskeletal: Negative for myalgias and neck pain.   Neurological: Positive for headaches (resolved).      Physical Exam  Laboratory/Imaging   Hemodynamics  Temp (24hrs), Av.8 °C (98.3 °F), Min:36 °C (96.8 °F), Max:37.3 °C (99.1 °F)   Temperature: 36.8 °C (98.2 °F)  Pulse  Av  Min: 45  Max: 67    Blood Pressure: 137/83      Respiratory      Respiration: (!) 21, Pulse Oximetry: 94 %             Fluids  No intake or output data in the 24 hours ending 18 1011    Nutrition  Orders Placed This Encounter   Procedures   • Diet Order Regular     Standing Status:   Standing     Number of Occurrences:   1     Order Specific Question:   Diet:     Answer:   Regular [1]     Physical Exam   Constitutional: She is oriented to person, place, and time.   HENT:   Head: Normocephalic and atraumatic.   Eyes: Conjunctivae are normal. No scleral icterus.   Neck: Neck supple. No JVD present.   Cardiovascular: Normal heart sounds.  Exam reveals no gallop.    Pulmonary/Chest: She has no wheezes. She has no rales.   Abdominal: Soft. Bowel sounds are normal. She exhibits no distension. There is no tenderness.   Musculoskeletal: She exhibits edema.   Neurological: She is  alert and oriented to person, place, and time.   Skin: Skin is warm and dry. No erythema.   Nursing note and vitals reviewed.      Recent Labs      09/01/18   1405  09/02/18   0422   WBC  11.2*  10.1   RBC  3.76*  3.62*   HEMOGLOBIN  10.5*  9.9*   HEMATOCRIT  32.5*  31.5*   MCV  86.4  87.0   MCH  27.9  27.3   MCHC  32.3*  31.4*   RDW  50.3*  50.3*   PLATELETCT  385  394   MPV  8.8*  9.0     Recent Labs      09/01/18   1405  09/02/18   0422   SODIUM  138  140   POTASSIUM  4.0  4.0   CHLORIDE  108  109   CO2  20  22   GLUCOSE  97  96   BUN  15  13   CREATININE  0.56  0.71   CALCIUM  8.7  8.6     Recent Labs      09/01/18   1405   APTT  20.5*   INR  0.90     Recent Labs      09/01/18   1405   BNPBTYPENAT  131*              Assessment/Plan     Headache- (present on admission)   Assessment & Plan    Headache has improved  MRI brain pending  Monitor              Leukocytosis- (present on admission)   Assessment & Plan    Trended down , probably reactive no s/s of infection         Hypertension- (present on admission)   Assessment & Plan    Cont on HCTZ improving         Edema- (present on admission)   Assessment & Plan    Improving  Echo and lower extremity duplex pending             Quality-Core Measures   Reviewed items::  Labs reviewed, Radiology images reviewed and Medications reviewed  Joiner catheter::  No Joiner

## 2018-09-02 NOTE — DISCHARGE PLANNING
Admitted for edema and HTN. Pt with baby in NICU, pt pending MRI, Echo and LE duplex. All diagnostic teams aware- MRI pending MRI sheet- BSRN aware. Possible DC home tomorrow if all diagnostics completed and neg.

## 2018-09-02 NOTE — PROGRESS NOTES
Patient received from day nurse Neha and assumed care at 1900. Patient OB Dr. Chase up to see patient. Patient went down to see infant in NICU with family. Patient educate on plan of care. Denies any needs at this time. Continue to monitor.

## 2018-09-02 NOTE — H&P
"Date of Service: 2018  3:22 PM         []Hide copied text  []Hover for attribution information  [facility] History & Physical Note     Date of Service  2018     Primary Care Physician  Antoine Altman M.D.     Consultants  Dr. Chase     Code Status  Full code     Chief Complaint  Edema legs     History of Presenting Illness  38 y.o. female who presented 2018 with history of recent vaginal delivery  at 39 weeks 2 days on 2018 patient of Dr. Chase. She was group B strep negative no complications postpartum. The patient states she last had a bowel movement yesterday normal she's been eating. She does complain of vague headache she says she had this headache all throughout her pregnancy as well. She also describes heavy load shift today 5 pads. She states she has had edema of her lower legs since delivery of her baby on 2018. The  and patient stated that the edema has actually gone down since delivery.  The patient and  state that her blood pressure have been elevated throughout her pregnancy however they were told that her blood pressure was normal. I do not have records of her blood pressure during pregnancy.\" Her baby is still in the NICU on 3rd floor serial at [facility] respiratory difficulties while feeding. Her last pregnancy was 15 years ago. She does not drink alcohol or smoke cigarettes. She had no complications with her pregnancy 15 years ago.     Review of Systems  Review of Systems   Constitutional: Negative for chills, diaphoresis, fever and malaise/fatigue.   HENT: Positive for ear pain. Negative for congestion and sore throat.    Eyes: Negative for pain and discharge.   Respiratory: Negative for cough, hemoptysis, sputum production, shortness of breath and wheezing.    Cardiovascular: Positive for chest pain and leg swelling. Negative for palpitations and claudication.   Gastrointestinal: Negative for abdominal pain, constipation, diarrhea, melena, nausea " and vomiting.   Genitourinary: Negative for dysuria, frequency and urgency.   Musculoskeletal: Negative for back pain, joint pain, myalgias and neck pain.   Skin: Negative for itching and rash.   Neurological: Positive for headaches. Negative for dizziness, sensory change, speech change, focal weakness, loss of consciousness and weakness.   Endo/Heme/Allergies: Does not bruise/bleed easily.   Psychiatric/Behavioral: Negative for depression, substance abuse and suicidal ideas.         Past Medical History   has a past medical history of  (normal spontaneous vaginal delivery) (2018).     Surgical History   has no past surgical history on file.      Family History  family history includes No Known Problems in her father and mother.      Social History   reports that she has never smoked. She has never used smokeless tobacco. She reports that she does not drink alcohol or use drugs.     Allergies  No Known Allergies     Medications  Prior to Admission Medications   Prescriptions Last Dose Informant Patient Reported? Taking?   Prenatal Multivit-Min-Fe-FA (PRENATAL VITAMINS PO) 2018 at Unknown time Patient Yes No   Sig: Take 1 Tab by mouth every day.   ibuprofen (MOTRIN) 600 MG Tab 2018 at 0800 Patient No No   Sig: Take 1 Tab by mouth every 6 hours as needed (For cramping after delivery; do not give if patient is receiving ketorolac (Toradol)).   oxyCODONE-acetaminophen (PERCOCET) 5-325 MG Tab 2018 at 2300 Patient No No   Sig: Take 1 Tab by mouth every four hours as needed for up to 7 days.      Facility-Administered Medications: None         Physical Exam  Blood Pressure: 156/92   Temperature: 36.1 °C (97 °F)   Pulse: (!) 56   Respiration: 18   Pulse Oximetry: 96 %      Physical Exam   Constitutional: She is oriented to person, place, and time. She appears well-developed and well-nourished. No distress.   Nontoxic appearance. Answered all questions appropriately.   HENT:   Head: Normocephalic and  atraumatic.   Right Ear: External ear normal.   Left Ear: External ear normal.   Nose: Nose normal.   Mouth/Throat: Oropharynx is clear and moist. No oropharyngeal exudate.   Bilateral tympanic membranes examined and normal.   Eyes: Pupils are equal, round, and reactive to light. Conjunctivae and EOM are normal. Right eye exhibits no discharge. Left eye exhibits no discharge. No scleral icterus.   Neck: Normal range of motion. Neck supple. No JVD present. No tracheal deviation present. No thyromegaly present.   Cardiovascular: Normal rate, regular rhythm, normal heart sounds and intact distal pulses.  Exam reveals no gallop and no friction rub.    No murmur heard.  Pulmonary/Chest: Effort normal and breath sounds normal. No stridor. No respiratory distress. She has no wheezes. She has no rales. She exhibits no tenderness.   Abdominal: Soft. Bowel sounds are normal. She exhibits no distension and no mass. There is no tenderness. There is no rebound and no guarding.   Musculoskeletal: Normal range of motion. She exhibits edema (1-2+ edema noted bilateral ankles. Normal DTRs.). She exhibits no tenderness.   Lymphadenopathy:     She has no cervical adenopathy.   Neurological: She is alert and oriented to person, place, and time. No cranial nerve deficit. She exhibits normal muscle tone. Coordination normal.   Skin: Skin is warm and dry. No rash noted. She is not diaphoretic. No erythema.   Psychiatric: She has a normal mood and affect. Her behavior is normal. Judgment and thought content normal.   Nursing note and vitals reviewed.        Laboratory:      Recent Labs      09/01/18   1405   WBC  11.2*   RBC  3.76*   HEMOGLOBIN  10.5*   HEMATOCRIT  32.5*   MCV  86.4   MCH  27.9   MCHC  32.3*   RDW  50.3*   PLATELETCT  385   MPV  8.8*          Recent Labs      09/01/18   1405   SODIUM  138   POTASSIUM  4.0   CHLORIDE  108   CO2  20   GLUCOSE  97   BUN  15   CREATININE  0.56   CALCIUM  8.7          Recent Labs       09/01/18   1405   ALTSGPT  58*   ASTSGOT  28   ALKPHOSPHAT  157*   TBILIRUBIN  0.3   LIPASE  19   GLUCOSE  97          Recent Labs      09/01/18   1405   APTT  20.5*   INR  0.90          Recent Labs      09/01/18   1405   BNPBTYPENAT  131*                Lab Results   Component Value Date     TROPONINI <0.02 09/01/2018         Urinalysis:        Recent Labs      09/01/18   1505   SPECGRAVITY  1.010   GLUCOSEUR  Negative   KETONES  Negative   NITRITE  Negative   LEUKESTERAS  Negative         Imaging:  DX-CHEST-PORTABLE (1 VIEW)   Final Result       No acute cardiac or pulmonary abnormalities are identified.       LE VENOUS DUPLEX - DVT ([facility] [place] [facility] Only)    (Results Pending)   ECHOCARDIOGRAM COMP W/O CONT    (Results Pending)            Assessment/Plan:  I anticipate this patient is appropriate for observation status at this time.         Headache- (present on admission)   Assessment & Plan     Patient states she's had a headache throughout her pregnancy off and on. She does not appear to be in acute distress. She was complaining of ear fullness however her bilateral TMs are examined and normal. She does not have any sinus tenderness with palpation. She states that her headache is all over. But does point to her forehead.  MRI brain w/o ordered.          Leukocytosis- (present on admission)   Assessment & Plan     Mild elevation of white count 11.2. UA is negative for infection chest x-ray negative likely patient has reactive leukocytosis from pregnancy          Transaminitis- (present on admission)   Assessment & Plan     Elevated ALT 58 upper quadrant. She is a non-alcohol drinker. Repeat in a.m.          Hypertension- (present on admission)   Assessment & Plan     Patient has hypertension 156/92 with repeat the same. Heart rate is 56 so no beta blockers. Likely patient could benefit from a blood pressure diuretic. Will await Dr. velasco recommendations for blood pressure medication. Otherwise would  recommend hydrochlorothiazide 25 mg daily.          Obesity (BMI 30-39.9)- (present on admission)   Assessment & Plan     Patient has a BMI of 39.6. Outpatient weight loss and exercise recommended.          Edema- (present on admission)   Assessment & Plan     Patient with lower extremity edema noted. The patient has been actually state that they have gone down since delivery over the last 3 days. She does have normal DTRs on exam and no proteinuria however she does have some mild transaminitis ALT 58.  I have ordered ultrasounds of the lower extremities as well as echocardiogram. Patient's EKG is normal sinus bradycardia at 58 no ST elevations or depressions.                VTE prophylaxis: scds

## 2018-09-02 NOTE — CARE PLAN
Problem: Safety  Goal: Will remain free from injury  Outcome: PROGRESSING AS EXPECTED  HD Fall Risk complete; patient educated on level of risk and proper identifiers in place.    Problem: Infection  Goal: Will remain free from infection  Outcome: PROGRESSING AS EXPECTED  Patient educate on infection prevention measures; Sani-hands provided.

## 2018-09-02 NOTE — CARE PLAN
Problem: Safety  Goal: Will remain free from injury  Outcome: PROGRESSING AS EXPECTED  Pt uses call bell for assistance. She ambulates independently and has visited her child in the NICU for feedings during the shift.

## 2018-09-02 NOTE — CARE PLAN
Problem: Venous Thromboembolism (VTW)/Deep Vein Thrombosis (DVT) Prevention:  Goal: Patient will participate in Venous Thrombosis (VTE)/Deep Vein Thrombosis (DVT)Prevention Measures    Intervention: Encourage ambulation/mobilization at level directed by Physical Therapy in collaboration with Interdisciplinary Team  Pt ambulates independently and uses wheelchair to leave unit. Venous Duplex of the lower extremities ordered but have not been done.

## 2018-09-02 NOTE — PROGRESS NOTES
Patient returned with family. Patient reports looking at infant in NICU through window. Patient did no give explanation as to why not going in to see child. Patient very tearful with family in room. Educated on depression.

## 2018-09-02 NOTE — CONSULTS
DATE OF SERVICE:  2018    REASON FOR CONSULTATION:  Elevated blood pressure 6 days after delivery.    HISTORY OF PRESENT ILLNESS:  A 38-year-old  3, para 3, status post a   vaginal delivery on 2018 after an unremarkable pregnancy.  Of note, the   patient had over 14 visits with our office with no elevated blood pressures,   the average blood pressures in the range of 110s/70s to high diastolic of 80   throughout her entire pregnancy.  The patient did have pedal edema, which is   very common in pregnancy.  She did complain of intermittent headaches, nothing   persistent, no scotomata, no right upper quadrant epigastric pain.  Her   delivery was unremarkable and she was discharged home.  She returned back with   complaints of bilateral pedal edema along with complaints of chest pressure   and pain.  She was seen in the [facility] emergency room, was transferred   to [facility] for evaluation of her dyspnea, chest   pressure, and pain, as well as her pedal edema and intermittently elevated   blood pressures, although she did have a diastolic in the 110s/70s.    PAST MEDICAL HISTORY:  Remarkable for intermittent headaches prior to   pregnancy and intermittently during the course of her pregnancy, none around   the time of delivery, none associated with scotomata or right upper quadrant   or epigastric pain.    PAST SURGICAL HISTORY:  She describes no previous surgeries.    OBSTETRICAL HISTORY:  In  at 40 weeks gestation after a 24-hour labor, the   patient had a spontaneous vaginal delivery, 7-pound 10-ounce infant in   [place].  In  at 40 weeks' gestation, the patient had a 10-pound   12-ounce infant at [facility], and again on 2018,   the patient underwent a spontaneous vaginal delivery with an unremarkable   postpartum course.    SOCIAL HISTORY:  She denies use of any alcohol, tobacco, or recreational drug   use.    MEDICATIONS:  Prenatal  vitamins, ibuprofen p.r.n., Percocet p.r.n. pain.    ALLERGIES:  No known drug allergies.    PHYSICAL EXAMINATION:  VITAL SIGNS:  She was afebrile.  Temperature 97 degrees, blood pressure   156/92, pulse 56, respirations 18, pulse ox 96% on room air.  GENERAL:  She is afebrile, hemodynamically stable.  She is alert and oriented   x3, is nontoxic in appearance.  ABDOMEN:  Soft, nondistended, nontender.  PELVIC:  Bimanual exam was deferred.  EXTREMITIES:  Per the patient, the patient does have 1-2+ pedal edema.  Normal   deep tendon reflexes.  She exhibits no calf tenderness.  On examination,   extremities were nontender.    LABORATORY STUDIES:  Include a hemoglobin and hematocrit of 10.5 and 32.5,   platelet count of 385,000.  Chemistry panel was unremarkable with a BUN and   creatinine of 15 and 0.56, respectively.  She does have a slightly elevated   ALT of 58.  Her AST was normal at 28, alkaline phosphatase of 157.  Her   troponin was negative.  Urinalysis shows no proteinuria.  Chest x-ray was   unremarkable.  Duplex Doppler currently pending as is an EKG.    ASSESSMENT AND PLAN:  A 38-year-old  3, para 3, presenting with   bilateral pedal edema.  She is not currently complaining of a headache,   scotomata, right upper quadrant or epigastric pain.  She does have elevated   blood pressure, has no proteinuria.  I would suggest getting a uric acid for   further evaluation of possible preeclampsia at which time, we may consider the   possibility of starting magnesium sulfate; however, more importantly is the   evaluation of her cardiac status and evaluate for any evidence of   cardiomyopathy.  Given the elevated blood pressure at this time, treating with   hydrochlorothiazide seems reasonable.  We will await the results of the uric   acid; however, currently the patient is asymptomatic with solely elevated   blood pressures, normal physical exam, normal deep tendon reflexes, no   proteinuria or other evidence  of preeclampsia, and we will observe the patient   overnight.  Should the uric acid be significantly elevated or should she   develop symptoms, we will plan on proceeding forward with magnesium sulfate   for 24-36 hours; however, we will hold at this time until further laboratory   studies are available or should her signs and symptoms change over the next   12-24 hours.       ____________________________________     MD TIFFANIE FERREIRA / LUMA    DD:  09/01/2018 19:46:11  DT:  09/01/2018 20:43:26    D#:  [id number]  Job#:  [id number]

## 2018-09-02 NOTE — PROGRESS NOTES
Patient arrived to unit from [facility] as direct admit; oriented to unit routine, communication folder provided and explained.    Patient resting comfortably in bed, no s/s of distress at this time. Patient able to make needs known and denies any at this time. Patient is very sad d/t  baby being in the NICU. Bed alarm not indicated, fall precautions implemented. Hourly rounding in place.     2 RN skin check complete; per pt she got a rash on her buttocks and BLE from pregnancy, healing scabs noted. Blister noted on inside of right pinky toe. No other skin breakdown noted.

## 2018-09-02 NOTE — PROGRESS NOTES
"Progress Note    Subjective:   Doing well. No issues or concerns. No sig issues of headache or sob or chest pain this am.     Objective Data:  Recent Labs      09/01/18   1405  09/02/18   0422   WBC  11.2*  10.1   RBC  3.76*  3.62*   HEMOGLOBIN  10.5*  9.9*   HEMATOCRIT  32.5*  31.5*   MCV  86.4  87.0   MCH  27.9  27.3   MCHC  32.3*  31.4*   RDW  50.3*  50.3*   PLATELETCT  385  394   MPV  8.8*  9.0     Recent Labs      09/01/18   1405  09/02/18   0422   SODIUM  138  140   POTASSIUM  4.0  4.0   CHLORIDE  108  109   CO2  20  22   GLUCOSE  97  96   BUN  15  13   CREATININE  0.56  0.71   CALCIUM  8.7  8.6       Vitals:    09/01/18 1902 09/01/18 1915 09/02/18 0418 09/02/18 0753   BP: 134/89 143/76 140/87 137/83   Pulse: 67 61 60 (!) 45   Resp: 20 17 18 (!) 21   Temp: 37.1 °C (98.8 °F) 37.3 °C (99.1 °F) 36 °C (96.8 °F) 36.8 °C (98.2 °F)   SpO2:  96% 97% 94%   Weight: 107.9 kg (237 lb 14 oz)      Height: 1.651 m (5' 5\")        Abdomen: soft non tender fundus  Perineum: no sig bleeding or discharge  Ext:non tender calves 1+ pedal edema with normal DTRs    No intake or output data in the 24 hours ending 09/02/18 0934    Current Facility-Administered Medications   Medication Dose Route Frequency Provider Last Rate Last Dose   • hydroCHLOROthiazide (HYDRODIURIL) tablet 12.5 mg  12.5 mg Oral Q DAY Eduardo Briseno M.D.       • acetaminophen (TYLENOL) tablet 650 mg  650 mg Oral Q6HRS PRN Andreina Martin M.D.   650 mg at 09/01/18 1843   • labetalol (NORMODYNE,TRANDATE) injection 10 mg  10 mg Intravenous Q4HRS PRN Andreina Martin M.D.       • ondansetron (ZOFRAN ODT) dispertab 4 mg  4 mg Oral Q4HRS PRN Andreina Martin M.D.       • ondansetron (ZOFRAN) syringe/vial injection 4 mg  4 mg Intravenous Q4HRS PRSOLOMON Martin M.D.       • prochlorperazine (COMPAZINE) injection 5-10 mg  5-10 mg Intravenous Q4HRS PRSOLOMON Martin M.D.       • promethazine (PHENERGAN) suppository 12.5-25 mg  12.5-25 mg Rectal Q4HRS PRN Andreina Martin M.D.       • " promethazine (PHENERGAN) tablet 12.5-25 mg  12.5-25 mg Oral Q4HRS PRN Andreina Martin M.D.       • senna-docusate (PERICOLACE or SENOKOT S) 8.6-50 MG per tablet 2 Tab  2 Tab Oral BID Andreina Martin M.D.   2 Tab at 18 1843    And   • polyethylene glycol/lytes (MIRALAX) PACKET 1 Packet  1 Packet Oral QDAY PRN Andreina Martin M.D.        And   • magnesium hydroxide (MILK OF MAGNESIA) suspension 30 mL  30 mL Oral QDAY PRN Andreina Martin M.D.        And   • bisacodyl (DULCOLAX) suppository 10 mg  10 mg Rectal QDAY PRN Andreina Martin M.D.           A/P  A 38-year-old  3, para 3, presenting with bilateral pedal edema normotensive. Asx. Plan to proceed with observation but does not appear to be consistent with pre eclampsia. Normal exam with normal DTRs and asx with normal creatinine and no proteinuria. May be candidate if stable for discharge with close f/u in 48 hours and strict precautions.

## 2018-09-02 NOTE — ED NOTES
ROSHAN arrived to transport pt to [facility].  LIZBETH Solomon at [facility] notified pt has left.

## 2018-09-03 VITALS
HEIGHT: 65 IN | SYSTOLIC BLOOD PRESSURE: 138 MMHG | DIASTOLIC BLOOD PRESSURE: 93 MMHG | OXYGEN SATURATION: 94 % | RESPIRATION RATE: 18 BRPM | BODY MASS INDEX: 39.63 KG/M2 | HEART RATE: 58 BPM | WEIGHT: 237.88 LBS | TEMPERATURE: 97.1 F

## 2018-09-03 LAB
ANION GAP SERPL CALC-SCNC: 8 MMOL/L (ref 0–11.9)
BUN SERPL-MCNC: 17 MG/DL (ref 8–22)
CALCIUM SERPL-MCNC: 8.7 MG/DL (ref 8.5–10.5)
CHLORIDE SERPL-SCNC: 108 MMOL/L (ref 96–112)
CO2 SERPL-SCNC: 21 MMOL/L (ref 20–33)
CREAT SERPL-MCNC: 0.66 MG/DL (ref 0.5–1.4)
ERYTHROCYTE [DISTWIDTH] IN BLOOD BY AUTOMATED COUNT: 48.5 FL (ref 35.9–50)
GLUCOSE SERPL-MCNC: 105 MG/DL (ref 65–99)
HCT VFR BLD AUTO: 31.6 % (ref 37–47)
HGB BLD-MCNC: 10.2 G/DL (ref 12–16)
MCH RBC QN AUTO: 27.7 PG (ref 27–33)
MCHC RBC AUTO-ENTMCNC: 32.3 G/DL (ref 33.6–35)
MCV RBC AUTO: 85.9 FL (ref 81.4–97.8)
PLATELET # BLD AUTO: 402 K/UL (ref 164–446)
PMV BLD AUTO: 9 FL (ref 9–12.9)
POTASSIUM SERPL-SCNC: 3.8 MMOL/L (ref 3.6–5.5)
RBC # BLD AUTO: 3.68 M/UL (ref 4.2–5.4)
SODIUM SERPL-SCNC: 137 MMOL/L (ref 135–145)
WBC # BLD AUTO: 9.2 K/UL (ref 4.8–10.8)

## 2018-09-03 PROCEDURE — A9270 NON-COVERED ITEM OR SERVICE: HCPCS | Performed by: INTERNAL MEDICINE

## 2018-09-03 PROCEDURE — 700102 HCHG RX REV CODE 250 W/ 637 OVERRIDE(OP): Performed by: INTERNAL MEDICINE

## 2018-09-03 PROCEDURE — 99217 PR OBSERVATION CARE DISCHARGE: CPT | Performed by: INTERNAL MEDICINE

## 2018-09-03 PROCEDURE — 80048 BASIC METABOLIC PNL TOTAL CA: CPT

## 2018-09-03 PROCEDURE — G0378 HOSPITAL OBSERVATION PER HR: HCPCS

## 2018-09-03 PROCEDURE — 93970 EXTREMITY STUDY: CPT

## 2018-09-03 PROCEDURE — 36415 COLL VENOUS BLD VENIPUNCTURE: CPT

## 2018-09-03 PROCEDURE — 85027 COMPLETE CBC AUTOMATED: CPT

## 2018-09-03 RX ADMIN — HYDROCHLOROTHIAZIDE 12.5 MG: 25 TABLET ORAL at 05:14

## 2018-09-03 ASSESSMENT — PAIN SCALES - GENERAL: PAINLEVEL_OUTOF10: 1

## 2018-09-03 NOTE — PROGRESS NOTES
Assumed care of patient at 1900. Patient just returning from seeing infant. Patient ambulated to room. Smiling. Family at bedside. Alert and oriented x4. Educated on plan of care. Patient with call light in reach. Denies any further questions or concerns. Talked with MRI and patient is supposed to have test this evening and duplex in am.

## 2018-09-03 NOTE — CARE PLAN
Problem: Safety  Goal: Will remain free from falls    Intervention: Implement fall precautions  Bed in lowest and locked position, non-skid socks in place, call light in reach, hourly rounding in place.      Problem: Knowledge Deficit  Goal: Knowledge of disease process/condition, treatment plan, diagnostic tests, and medications will improve    Intervention: Explain information regarding disease process/condition, treatment plan, diagnostic tests, and medications and document in education  Patient educated about need for venous duplex prior to discharge. Pt verbalized understanding.

## 2018-09-03 NOTE — DISCHARGE INSTRUCTIONS
Discharge Instructions    Discharged to home by car with relative. Discharged via wheelchair, hospital escort: Yes.  Special equipment needed: Not Applicable    Be sure to schedule a follow-up appointment with your primary care doctor or any specialists as instructed.     Discharge Plan:   Diet Plan: Discussed  Activity Level: Discussed  Confirmed Follow up Appointment: Patient to Call and Schedule Appointment  Confirmed Symptoms Management: Discussed  Medication Reconciliation Updated: Yes  Influenza Vaccine Indication: Indicated: Not available from distributor/    I understand that a diet low in cholesterol, fat, and sodium is recommended for good health. Unless I have been given specific instructions below for another diet, I accept this instruction as my diet prescription.   Other diet: Regular, as tolerated    Special Instructions: None    · Is patient discharged on Warfarin / Coumadin?   No                 Edema periférico  (Peripheral Edema)  El edema periférico es la hinchazón causada por la acumulación de líquido. En la mayoría de los casos, el edema periférico afecta la parte inferior de las piernas, los tobillos y los pies. También puede afectar los brazos, las starr y la oleg. La sidney del cuerpo que presenta edema periférico se verá hinchada. También puede sentirse pesada o caliente. Es posible que sienta que la ropa comienza a apretarle. La presión sobre el área puede dejar miya ronen temporal en la piel. Dima vez no pueda  el brazo o la pierna norberto lo hace habitualmente.  Hay muchas causas posibles de edema periférico. Puede ser miya complicación de otras enfermedades, norberto insuficiencia cardíaca congestiva, enfermedad renal o un problema con la circulación sanguínea. También puede ser un efecto secundario de ciertos medicamentos. Es frecuente huy el embarazo. A veces, la causa es desconocida. A menudo, la única solución para el edema periférico es el tratamiento de la afección  preexistente.  INSTRUCCIONES PARA EL CUIDADO EN EL HOGAR  Esté atento a cualquier cambio en los síntomas. Zapata estas medidas para aliviar las molestias:  · Eleve las piernas mientras esté sentado o recostado.  · Muévase con frecuencia para evitar el entumecimiento y reducir la hinchazón. No permanezca sentado o de pie huy largos períodos.  · Use medias de compresión norberto le haya indicado shah médico.  · Siga las indicaciones del médico respecto de las restricciones en el consumo de sal (sodio) en la dieta. A veces, disminuir el consumo de sal puede reducir la hinchazón.  · Zapata los medicamentos de venta dacia y los recetados solamente norberto se lo haya indicado el médico. El médico puede recetarle un medicamento para ayudar a que el cuerpo elimine el exceso de agua (diurético).  · Concurra a todas las visitas de control norberto se lo haya indicado el médico. [place] es importante.  SOLICITE ATENCIÓN MÉDICA SI:  · Tiene fiebre.  · El edema aparece de forma repentina o empeora, en especial si usted está embarazada o tiene miya enfermedad.  · Tiene hinchazón en miya morris pierna.  · Le aumenta la hinchazón y el dolor en las piernas.  SOLICITE ATENCIÓN MÉDICA DE INMEDIATO SI:  · Le falta el aire, especialmente al estar acostado.  · Tiene dolor en el pecho o el abdomen.  · Se siente débil.  · Se desmaya.  Esta información no tiene norberto fin reemplazar el consejo del médico. Asegúrese de hacerle al médico cualquier pregunta que tenga.  Document Released: 12/18/2006 Document Revised: 04/10/2017 Document Reviewed: 06/29/2016  [employer] Interactive Patient Education © 2017 [employer]      Depression / Suicide Risk    As you are discharged from this [facility] facility, it is important to learn how to keep safe from harming yourself.    Recognize the warning signs:  · Abrupt changes in personality, positive or negative- including increase in energy   · Giving away possessions  · Change in eating patterns- significant weight  changes-  positive or negative  · Change in sleeping patterns- unable to sleep or sleeping all the time   · Unwillingness or inability to communicate  · Depression  · Unusual sadness, discouragement and loneliness  · Talk of wanting to die  · Neglect of personal appearance   · Rebelliousness- reckless behavior  · Withdrawal from people/activities they love  · Confusion- inability to concentrate     If you or a loved one observes any of these behaviors or has concerns about self-harm, here's what you can do:  · Talk about it- your feelings and reasons for harming yourself  · Remove any means that you might use to hurt yourself (examples: pills, rope, extension cords, firearm)  · Get professional help from the community (Mental Health, Substance Abuse, psychological counseling)  · Do not be alone:Call your Safe Contact- someone whom you trust who will be there for you.  · Call your local CRISIS HOTLINE 480-5358 or 008-775-9823  · Call your local Children's Mobile Crisis Response Team [place] (411) 170-2025 or www.[employer]  · Call the toll free National Suicide Prevention Hotlines   · National Suicide Prevention Lifeline 936-890-UIEV (1722)  · National Hope Line Network 800-SUICIDE (549-9159)

## 2018-09-03 NOTE — CARE PLAN
Problem: Communication  Goal: The ability to communicate needs accurately and effectively will improve  Outcome: PROGRESSING AS EXPECTED  Patient is smiling more and making eye contact. Has not been tearful this shift. Calls approprietly.     Problem: Pain Management  Goal: Pain level will decrease to patient's comfort goal  Outcome: NOT MET  Patient denied any HA this shift. Pain is vaginal from child birth. Refused any medications. Sailaja spray bottle given. Educated on nonpharm pain relieving methods

## 2018-09-03 NOTE — PROGRESS NOTES
Patient back up to unit after visiting baby in NICU. Pt smiling, pleasant. Pt reports no headache for 2 nights. Pt denies acute pain, N&V, SOB. Fall precautions in place. Call light in reach, hourly rounding in place.

## 2018-09-03 NOTE — DISCHARGE SUMMARY
Discharge Summary    CHIEF COMPLAINT ON ADMISSION  No chief complaint on file.      Reason for Admission  Edema     Admission Date  9/1/2018    CODE STATUS  Full Code    HPI & HOSPITAL COURSE  This is a 38 y.o. female who was admitted on 9/1/18 after presenting to ER complaining of lower extremity edema and headache. Pt had a recent vaginal delivery on 8/26/18. She was also found to have elevated BP.  Pt was admitted for observation and work up. An echo was done showing normal left ventricular systolic function.Left ventricular ejection fraction is visually estimated to be 60%. Her BP improved  OB/GYN, Dr. Chase was also consulted.  Regarding her headache, a Ct head  And MRI brain were done  and were negative. Her headache resolved also, also her lower extremity swelling improved, after she was placed on HCTZ  Pt is feeling now well, she will be discharged home today       Discharge Date  9/3/18        DISCHARGE DIAGNOSES  Active Problems:    Edema POA: Yes    Hypertension POA: Yes    Leukocytosis POA: Yes    Headache POA: Yes  Resolved Problems:    * No resolved hospital problems. *      FOLLOW UP  No future appointments.  No follow-up provider specified.    MEDICATIONS ON DISCHARGE     Medication List      CONTINUE taking these medications      Instructions   oxyCODONE-acetaminophen 5-325 MG Tabs  Commonly known as:  PERCOCET   Take 1 Tab by mouth every four hours as needed for up to 7 days.  Dose:  1 Tab     PRENATAL VITAMINS PO   Take 1 Tab by mouth every day.  Dose:  1 Tab        STOP taking these medications    ibuprofen 600 MG Tabs  Commonly known as:  MOTRIN            Allergies  No Known Allergies    DIET  Orders Placed This Encounter   Procedures   • Diet Order Regular     Standing Status:   Standing     Number of Occurrences:   1     Order Specific Question:   Diet:     Answer:   Regular [1]       ACTIVITY  As tolerated.      CONSULTATIONS  OB/GYN: Dr. Chase    PROCEDURES  None     LABORATORY  Lab  Results   Component Value Date    SODIUM 137 09/03/2018    POTASSIUM 3.8 09/03/2018    CHLORIDE 108 09/03/2018    CO2 21 09/03/2018    GLUCOSE 105 (H) 09/03/2018    BUN 17 09/03/2018    CREATININE 0.66 09/03/2018        Lab Results   Component Value Date    WBC 9.2 09/03/2018    HEMOGLOBIN 10.2 (L) 09/03/2018    HEMATOCRIT 31.6 (L) 09/03/2018    PLATELETCT 402 09/03/2018        Total time of the discharge process exceeds 30 minutes.

## 2018-09-06 NOTE — ADDENDUM NOTE
Encounter addended by: Orlin Fowler R.N. on: 9/5/2018  8:36 PM<BR>    Actions taken: Flowsheet accepted

## 2018-10-29 ENCOUNTER — HOSPITAL ENCOUNTER (OUTPATIENT)
Dept: LAB | Facility: MEDICAL CENTER | Age: 38
End: 2018-10-29
Attending: SPECIALIST
Payer: COMMERCIAL

## 2018-10-29 PROCEDURE — 87624 HPV HI-RISK TYP POOLED RSLT: CPT

## 2018-10-29 PROCEDURE — 88175 CYTOPATH C/V AUTO FLUID REDO: CPT

## 2018-10-30 LAB
CYTOLOGY REG CYTOL: NORMAL
HPV HR 12 DNA CVX QL NAA+PROBE: NEGATIVE
HPV16 DNA SPEC QL NAA+PROBE: NEGATIVE
HPV18 DNA SPEC QL NAA+PROBE: NEGATIVE
SPECIMEN SOURCE: NORMAL

## 2020-06-10 ENCOUNTER — HOSPITAL ENCOUNTER (OUTPATIENT)
Dept: LAB | Facility: MEDICAL CENTER | Age: 40
End: 2020-06-10
Attending: SPECIALIST
Payer: MEDICAID

## 2020-06-10 PROCEDURE — 87491 CHLMYD TRACH DNA AMP PROBE: CPT

## 2020-06-10 PROCEDURE — 88175 CYTOPATH C/V AUTO FLUID REDO: CPT

## 2020-06-10 PROCEDURE — 87624 HPV HI-RISK TYP POOLED RSLT: CPT

## 2020-06-10 PROCEDURE — 87591 N.GONORRHOEAE DNA AMP PROB: CPT

## 2021-11-17 ENCOUNTER — HOSPITAL ENCOUNTER (OUTPATIENT)
Facility: MEDICAL CENTER | Age: 41
End: 2021-11-17
Attending: SPECIALIST
Payer: MEDICAID

## 2021-11-17 PROCEDURE — 87591 N.GONORRHOEAE DNA AMP PROB: CPT

## 2021-11-17 PROCEDURE — 87491 CHLMYD TRACH DNA AMP PROBE: CPT

## 2021-11-17 PROCEDURE — 88175 CYTOPATH C/V AUTO FLUID REDO: CPT

## 2021-11-17 PROCEDURE — 87624 HPV HI-RISK TYP POOLED RSLT: CPT

## 2021-11-29 ENCOUNTER — HOSPITAL ENCOUNTER (OUTPATIENT)
Facility: MEDICAL CENTER | Age: 41
End: 2021-11-29
Attending: SPECIALIST
Payer: MEDICAID

## 2021-11-29 PROCEDURE — 88304 TISSUE EXAM BY PATHOLOGIST: CPT

## 2021-11-30 LAB
AMBIGUOUS DTTM AMBI4: NORMAL
PATHOLOGY CONSULT NOTE: NORMAL

## 2022-10-05 NOTE — PROGRESS NOTES
Patient discharge home. Infant staying in nursery as level II.   Aklief counseling:  Patient advised to apply a pea-sized amount only at bedtime and wait 30 minutes after washing their face before applying.  If too drying, patient may add a non-comedogenic moisturizer.  The most commonly reported side effects including irritation, redness, scaling, dryness, stinging, burning, itching, and increased risk of sunburn.  The patient verbalized understanding of the proper use and possible adverse effects of retinoids.  All of the patient's questions and concerns were addressed.

## 2023-06-12 ENCOUNTER — APPOINTMENT (OUTPATIENT)
Dept: RADIOLOGY | Facility: MEDICAL CENTER | Age: 43
End: 2023-06-12
Attending: EMERGENCY MEDICINE
Payer: MEDICAID

## 2023-06-12 ENCOUNTER — HOSPITAL ENCOUNTER (EMERGENCY)
Facility: MEDICAL CENTER | Age: 43
End: 2023-06-12
Attending: EMERGENCY MEDICINE
Payer: MEDICAID

## 2023-06-12 VITALS
BODY MASS INDEX: 35.96 KG/M2 | RESPIRATION RATE: 19 BRPM | DIASTOLIC BLOOD PRESSURE: 70 MMHG | WEIGHT: 215.83 LBS | TEMPERATURE: 98 F | HEIGHT: 65 IN | SYSTOLIC BLOOD PRESSURE: 111 MMHG | OXYGEN SATURATION: 96 % | HEART RATE: 64 BPM

## 2023-06-12 DIAGNOSIS — R07.9 LEFT-SIDED CHEST PAIN: ICD-10-CM

## 2023-06-12 LAB
ALBUMIN SERPL BCP-MCNC: 4.3 G/DL (ref 3.2–4.9)
ALBUMIN/GLOB SERPL: 1.3 G/DL
ALP SERPL-CCNC: 86 U/L (ref 30–99)
ALT SERPL-CCNC: 10 U/L (ref 2–50)
ANION GAP SERPL CALC-SCNC: 10 MMOL/L (ref 7–16)
AST SERPL-CCNC: 14 U/L (ref 12–45)
BASOPHILS # BLD AUTO: 0.6 % (ref 0–1.8)
BASOPHILS # BLD: 0.07 K/UL (ref 0–0.12)
BILIRUB SERPL-MCNC: 0.2 MG/DL (ref 0.1–1.5)
BUN SERPL-MCNC: 16 MG/DL (ref 8–22)
CALCIUM ALBUM COR SERPL-MCNC: 9.2 MG/DL (ref 8.5–10.5)
CALCIUM SERPL-MCNC: 9.4 MG/DL (ref 8.5–10.5)
CHLORIDE SERPL-SCNC: 105 MMOL/L (ref 96–112)
CO2 SERPL-SCNC: 22 MMOL/L (ref 20–33)
CREAT SERPL-MCNC: 0.66 MG/DL (ref 0.5–1.4)
EKG IMPRESSION: NORMAL
EOSINOPHIL # BLD AUTO: 0.19 K/UL (ref 0–0.51)
EOSINOPHIL NFR BLD: 1.6 % (ref 0–6.9)
ERYTHROCYTE [DISTWIDTH] IN BLOOD BY AUTOMATED COUNT: 43.5 FL (ref 35.9–50)
GFR SERPLBLD CREATININE-BSD FMLA CKD-EPI: 111 ML/MIN/1.73 M 2
GLOBULIN SER CALC-MCNC: 3.4 G/DL (ref 1.9–3.5)
GLUCOSE SERPL-MCNC: 93 MG/DL (ref 65–99)
HCG SERPL QL: NEGATIVE
HCT VFR BLD AUTO: 40.7 % (ref 37–47)
HGB BLD-MCNC: 13.5 G/DL (ref 12–16)
IMM GRANULOCYTES # BLD AUTO: 0.04 K/UL (ref 0–0.11)
IMM GRANULOCYTES NFR BLD AUTO: 0.3 % (ref 0–0.9)
LYMPHOCYTES # BLD AUTO: 4.53 K/UL (ref 1–4.8)
LYMPHOCYTES NFR BLD: 38.8 % (ref 22–41)
MCH RBC QN AUTO: 27.6 PG (ref 27–33)
MCHC RBC AUTO-ENTMCNC: 33.2 G/DL (ref 32.2–35.5)
MCV RBC AUTO: 83.2 FL (ref 81.4–97.8)
MONOCYTES # BLD AUTO: 0.66 K/UL (ref 0–0.85)
MONOCYTES NFR BLD AUTO: 5.7 % (ref 0–13.4)
NEUTROPHILS # BLD AUTO: 6.19 K/UL (ref 1.82–7.42)
NEUTROPHILS NFR BLD: 53 % (ref 44–72)
NRBC # BLD AUTO: 0 K/UL
NRBC BLD-RTO: 0 /100 WBC (ref 0–0.2)
PLATELET # BLD AUTO: 453 K/UL (ref 164–446)
PMV BLD AUTO: 8.4 FL (ref 9–12.9)
POTASSIUM SERPL-SCNC: 4.1 MMOL/L (ref 3.6–5.5)
PROT SERPL-MCNC: 7.7 G/DL (ref 6–8.2)
RBC # BLD AUTO: 4.89 M/UL (ref 4.2–5.4)
SODIUM SERPL-SCNC: 137 MMOL/L (ref 135–145)
TROPONIN T SERPL-MCNC: <6 NG/L (ref 6–19)
WBC # BLD AUTO: 11.7 K/UL (ref 4.8–10.8)

## 2023-06-12 PROCEDURE — 84703 CHORIONIC GONADOTROPIN ASSAY: CPT

## 2023-06-12 PROCEDURE — 93005 ELECTROCARDIOGRAM TRACING: CPT | Performed by: EMERGENCY MEDICINE

## 2023-06-12 PROCEDURE — 93005 ELECTROCARDIOGRAM TRACING: CPT

## 2023-06-12 PROCEDURE — 85025 COMPLETE CBC W/AUTO DIFF WBC: CPT

## 2023-06-12 PROCEDURE — 36415 COLL VENOUS BLD VENIPUNCTURE: CPT

## 2023-06-12 PROCEDURE — 99284 EMERGENCY DEPT VISIT MOD MDM: CPT

## 2023-06-12 PROCEDURE — 80053 COMPREHEN METABOLIC PANEL: CPT

## 2023-06-12 PROCEDURE — 84484 ASSAY OF TROPONIN QUANT: CPT

## 2023-06-12 PROCEDURE — 71045 X-RAY EXAM CHEST 1 VIEW: CPT

## 2023-06-12 PROCEDURE — 700102 HCHG RX REV CODE 250 W/ 637 OVERRIDE(OP): Mod: UD | Performed by: EMERGENCY MEDICINE

## 2023-06-12 PROCEDURE — A9270 NON-COVERED ITEM OR SERVICE: HCPCS | Mod: UD | Performed by: EMERGENCY MEDICINE

## 2023-06-12 RX ORDER — IBUPROFEN 600 MG/1
600 TABLET ORAL ONCE
Status: COMPLETED | OUTPATIENT
Start: 2023-06-12 | End: 2023-06-12

## 2023-06-12 RX ORDER — NAPROXEN 375 MG/1
375 TABLET ORAL 2 TIMES DAILY WITH MEALS
Qty: 20 TABLET | Refills: 0 | Status: SHIPPED | OUTPATIENT
Start: 2023-06-12

## 2023-06-12 RX ORDER — ACETAMINOPHEN 500 MG
1000 TABLET ORAL ONCE
Status: COMPLETED | OUTPATIENT
Start: 2023-06-12 | End: 2023-06-12

## 2023-06-12 RX ADMIN — IBUPROFEN 600 MG: 600 TABLET, FILM COATED ORAL at 16:50

## 2023-06-12 RX ADMIN — ACETAMINOPHEN 1000 MG: 500 TABLET, FILM COATED ORAL at 16:51

## 2023-06-12 NOTE — ED TRIAGE NOTES
"Chief Complaint   Patient presents with    Chest Pain     Intermittent left sided CP since Friday. Radiates to upper back. Describes as sharp. +SOB. +Nausea. +Jaw discomfort.-Numbness/tingling.      R bp: 120/82, L bp: 128/83  Pt amb with steady gait in triage. Pt educated to inform staff of any changes. EKG done. Protocol ordered.     /85   Pulse 89   Temp 36.6 °C (97.9 °F) (Temporal)   Resp 18   Ht 1.651 m (5' 5\")   Wt 97.9 kg (215 lb 13.3 oz)   SpO2 98%   BMI 35.92 kg/m²     "

## 2023-06-12 NOTE — ED PROVIDER NOTES
"ER Provider Note    Scribed for Dhiraj Hill D.O. by Vince Cortez. 2023  4:37 PM    Primary Care Provider: Antoine Altman M.D.    CHIEF COMPLAINT  Chief Complaint   Patient presents with    Chest Pain     Intermittent left sided CP since Friday. Radiates to upper back. Describes as sharp. +SOB. +Nausea. +Jaw discomfort.-Numbness/tingling.        HPI/ROS  OUTSIDE HISTORIAN(S):  None    Therese Stephens is a 43 y.o. female who presents to the Emergency Department for back pain onset 3 days ago. The patient states the back pain radiates to her chest. She adds moving her shoulder hurts. She reports she stays at home and does no heavy lifting. She has associated symptoms of painful breathing, but denies nausea, sweating, cough, congestion, or flu-like symptoms. No alleviating or exacerbating factors reported. She denies history of diabetes and denies any family medical history. She denies smoking    ROS as per HPI.    PAST MEDICAL HISTORY  Past Medical History:   Diagnosis Date     (normal spontaneous vaginal delivery) 2018    no complications       SURGICAL HISTORY  History reviewed. No pertinent surgical history.    FAMILY HISTORY  Family History   Problem Relation Age of Onset    No Known Problems Mother     No Known Problems Father     Hypertension Neg Hx     Heart Disease Neg Hx     Stroke Neg Hx     Cancer Neg Hx        SOCIAL HISTORY   reports that she has never smoked. She has never used smokeless tobacco. She reports that she does not drink alcohol and does not use drugs.    CURRENT MEDICATIONS  Discharge Medication List as of 2023  6:03 PM        CONTINUE these medications which have NOT CHANGED    Details   Prenatal Multivit-Min-Fe-FA (PRENATAL VITAMINS PO) Take 1 Tab by mouth every day., Historical Med             ALLERGIES  Patient has no known allergies.    PHYSICAL EXAM  /85   Pulse 89   Temp 36.6 °C (97.9 °F) (Temporal)   Resp 18   Ht 1.651 m (5' 5\")   Wt 97.9 " kg (215 lb 13.3 oz)   SpO2 98%   BMI 35.92 kg/m²     General: No acute distress.  HENT: Normocephalic, Mucus membranes are moist.   Chest: Lungs have even and unlabored respirations, Clear to auscultation.   Cardiovascular: Regular rate and regular rhythm, No peripheral cyanosis.  Abdomen: Non distended. Mild tenderness to left chest.  Neuro: Awake, Conversive, Able to relay recent events.  Psychiatric: Calm and cooperative.     EXTERNAL RECORDS REVIEWED  No history of cardiac disease.    INITIAL ASSESSMENT  Patient presents with left back pain mostly in trapezius area that radiates to left upper chest. She says it hurts to breath, but says she does not experience shortness of breath. No cardiac risk factors except age over 40. EKG shows no STEMI. Will conduct cardiac evaluation for heart injury and chest x-ray for pneumonia.    ED Observation Status? Yes; I am placing the patient in to an observation status due to a diagnostic uncertainty as well as therapeutic intensity. Patient placed in observation status at 4:43 PM, 6/12/2023.     Observation plan is as follows: will evaluate pain for pain medications while awaiting labs and imaging.    Upon Reevaluation, the patient's condition has: Improved; and will be discharged.    Patient discharged from ED Observation status at 5:56 PM, 6/12/2023.    DIAGNOSTIC STUDIES    Labs:   Results for orders placed or performed during the hospital encounter of 06/12/23   CBC with Differential   Result Value Ref Range    WBC 11.7 (H) 4.8 - 10.8 K/uL    RBC 4.89 4.20 - 5.40 M/uL    Hemoglobin 13.5 12.0 - 16.0 g/dL    Hematocrit 40.7 37.0 - 47.0 %    MCV 83.2 81.4 - 97.8 fL    MCH 27.6 27.0 - 33.0 pg    MCHC 33.2 32.2 - 35.5 g/dL    RDW 43.5 35.9 - 50.0 fL    Platelet Count 453 (H) 164 - 446 K/uL    MPV 8.4 (L) 9.0 - 12.9 fL    Neutrophils-Polys 53.00 44.00 - 72.00 %    Lymphocytes 38.80 22.00 - 41.00 %    Monocytes 5.70 0.00 - 13.40 %    Eosinophils 1.60 0.00 - 6.90 %    Basophils  0.60 0.00 - 1.80 %    Immature Granulocytes 0.30 0.00 - 0.90 %    Nucleated RBC 0.00 0.00 - 0.20 /100 WBC    Neutrophils (Absolute) 6.19 1.82 - 7.42 K/uL    Lymphs (Absolute) 4.53 1.00 - 4.80 K/uL    Monos (Absolute) 0.66 0.00 - 0.85 K/uL    Eos (Absolute) 0.19 0.00 - 0.51 K/uL    Baso (Absolute) 0.07 0.00 - 0.12 K/uL    Immature Granulocytes (abs) 0.04 0.00 - 0.11 K/uL    NRBC (Absolute) 0.00 K/uL   Complete Metabolic Panel (CMP)   Result Value Ref Range    Sodium 137 135 - 145 mmol/L    Potassium 4.1 3.6 - 5.5 mmol/L    Chloride 105 96 - 112 mmol/L    Co2 22 20 - 33 mmol/L    Anion Gap 10.0 7.0 - 16.0    Glucose 93 65 - 99 mg/dL    Bun 16 8 - 22 mg/dL    Creatinine 0.66 0.50 - 1.40 mg/dL    Calcium 9.4 8.5 - 10.5 mg/dL    AST(SGOT) 14 12 - 45 U/L    ALT(SGPT) 10 2 - 50 U/L    Alkaline Phosphatase 86 30 - 99 U/L    Total Bilirubin 0.2 0.1 - 1.5 mg/dL    Albumin 4.3 3.2 - 4.9 g/dL    Total Protein 7.7 6.0 - 8.2 g/dL    Globulin 3.4 1.9 - 3.5 g/dL    A-G Ratio 1.3 g/dL   Troponins NOW   Result Value Ref Range    Troponin T <6 6 - 19 ng/L   HCG Qual Serum   Result Value Ref Range    Beta-Hcg Qualitative Serum Negative Negative   CORRECTED CALCIUM   Result Value Ref Range    Correct Calcium 9.2 8.5 - 10.5 mg/dL   ESTIMATED GFR   Result Value Ref Range    GFR (CKD-EPI) 111 >60 mL/min/1.73 m 2   EKG (NOW)   Result Value Ref Range    Report       [facility] Emergency Dept.    Test Date:  2023  Pt Name:    HEAVENLY SOTOMAYOR              Department: ER  MRN:        [id number]                      Room:       ORTHO  Gender:     Female                       Technician: [id number]  :        1980                   Requested By:ER TRIAGE PROTOCOL  Order #:    [id number]                    Reading MD: PACO BOLES D.O.    Measurements  Intervals                                Axis  Rate:       71                           P:          32  IL:         166                          QRS:        44  QRSD:        91                           T:          60  QT:         371  QTc:        404    Interpretive Statements  Sinus rhythm  Compared to ECG 09/01/2018 13:30:02  Sinus bradycardia no longer present  Electronically Signed On 6- 17:50:37 PDT by PACO BOLES D.O.           EKG:   I have independently interpreted the above EKG.    Radiology:   The attending emergency physician has independently interpreted the diagnostic imaging associated with this visit and am waiting the final reading from the radiologist.   Preliminary interpretation is as follows: No acute disease  Radiologist interpretation:   DX-CHEST-PORTABLE (1 VIEW)   Final Result      No acute cardiac or pulmonary abnormalities are identified.            COURSE & MEDICAL DECISION MAKING     COURSE AND PLAN  4:37 PM - Patient seen and examined at bedside. Discussed plan of care, including labs, imaging, and pain control. Patient agrees to the plan of care. The patient will be medicated with Motrin and Tylenol. Ordered for EKG, HCG qual serum, troponins now and in 2 hours, CMP, CBC w/ diff, and DX-chest 1 view to evaluate her symptoms.     5:57 PM - I reevaluated the patient at bedside. The patient informs me they feel mildly improved following administration. I discussed the patient's diagnostic study results which show no acute cardiac or pulmonary abnormalities. I discussed plan for discharge and follow up as outlined below. The patient is stable for discharge at this time and will return for any new or worsening symptoms. Patient verbalizes understanding and support with my plan for discharge.     ED Summary: Patient presents with a pain into the left trapezius, radiates down in the left chest.  It is reproducible.  Her troponin is negative chest x-ray is normal, her heart score is 1.    She will be discharged home with naproxen.  Her pain is improved after treatment.  There is no consideration or concerns for cardiac disease based on history  and physical.    Decision tools and prescription drugs considered including, but not limited to: Heart Score of 1. Naproxen.    ADDITIONAL PROBLEM LIST      DISPOSITION AND DISCUSSIONS    The patient will return for new or worsening symptoms and is stable at the time of discharge.    DISPOSITION:  Patient will be discharged home in stable condition.    FOLLOW UP:  Antoine Altman M.D.  [address] [address]  [phone]          OUTPATIENT MEDICATIONS:  Discharge Medication List as of 6/12/2023  6:03 PM        START taking these medications    Details   naproxen (NAPROSYN) 375 MG Tab Take 1 Tablet by mouth 2 times a day with meals., Disp-20 Tablet, R-0, Normal           FINAL DIAGNOSIS  1. Left-sided chest pain         Vince MANZANO (Scribe), am scribing for, and in the presence of, Dhiraj Hill D.O..    Electronically signed by: Vince Cortez (Scribe), 6/12/2023    Dhiraj MANZANO D.O. personally performed the services described in this documentation, as scribed by Vince Cortez in my presence, and it is both accurate and complete.    The note accurately reflects work and decisions made by me.  Dhiraj Hill D.O.  6/12/2023  7:13 PM

## 2023-06-13 NOTE — ED NOTES
Pt discharged home as ordered by erp. Pt instructed to follow up with her PCP and return here as needed. Pt instructed on where to  RX. Pt left ambulating independently

## 2023-06-13 NOTE — DISCHARGE INSTRUCTIONS
Your pain pattern is consistent with a muscle pain.  Use pain medication as prescribed.  Use ice packs to assist with discomfort.  Return for any change or worsening symptoms

## 2024-09-15 ENCOUNTER — APPOINTMENT (OUTPATIENT)
Dept: RADIOLOGY | Facility: MEDICAL CENTER | Age: 44
End: 2024-09-15
Attending: EMERGENCY MEDICINE
Payer: MEDICAID

## 2024-09-15 ENCOUNTER — HOSPITAL ENCOUNTER (EMERGENCY)
Facility: MEDICAL CENTER | Age: 44
End: 2024-09-15
Attending: EMERGENCY MEDICINE
Payer: MEDICAID

## 2024-09-15 VITALS
SYSTOLIC BLOOD PRESSURE: 114 MMHG | BODY MASS INDEX: 34.64 KG/M2 | HEIGHT: 65 IN | WEIGHT: 207.89 LBS | OXYGEN SATURATION: 94 % | DIASTOLIC BLOOD PRESSURE: 77 MMHG | HEART RATE: 74 BPM | RESPIRATION RATE: 24 BRPM | TEMPERATURE: 96.2 F

## 2024-09-15 DIAGNOSIS — J18.9 PNEUMONIA OF LEFT LOWER LOBE DUE TO INFECTIOUS ORGANISM: ICD-10-CM

## 2024-09-15 LAB
ALBUMIN SERPL BCP-MCNC: 3.4 G/DL (ref 3.2–4.9)
ALBUMIN/GLOB SERPL: 0.7 G/DL
ALP SERPL-CCNC: 178 U/L (ref 30–99)
ALT SERPL-CCNC: 22 U/L (ref 2–50)
ANION GAP SERPL CALC-SCNC: 17 MMOL/L (ref 7–16)
AST SERPL-CCNC: 39 U/L (ref 12–45)
BASOPHILS # BLD AUTO: 0.4 % (ref 0–1.8)
BASOPHILS # BLD: 0.05 K/UL (ref 0–0.12)
BILIRUB SERPL-MCNC: 0.3 MG/DL (ref 0.1–1.5)
BUN SERPL-MCNC: 12 MG/DL (ref 8–22)
CALCIUM ALBUM COR SERPL-MCNC: 9.8 MG/DL (ref 8.5–10.5)
CALCIUM SERPL-MCNC: 9.3 MG/DL (ref 8.4–10.2)
CHLORIDE SERPL-SCNC: 95 MMOL/L (ref 96–112)
CO2 SERPL-SCNC: 23 MMOL/L (ref 20–33)
CREAT SERPL-MCNC: 0.52 MG/DL (ref 0.5–1.4)
EOSINOPHIL # BLD AUTO: 0.08 K/UL (ref 0–0.51)
EOSINOPHIL NFR BLD: 0.7 % (ref 0–6.9)
ERYTHROCYTE [DISTWIDTH] IN BLOOD BY AUTOMATED COUNT: 43.8 FL (ref 35.9–50)
FLUAV RNA SPEC QL NAA+PROBE: NEGATIVE
FLUBV RNA SPEC QL NAA+PROBE: NEGATIVE
GFR SERPLBLD CREATININE-BSD FMLA CKD-EPI: 117 ML/MIN/1.73 M 2
GLOBULIN SER CALC-MCNC: 4.7 G/DL (ref 1.9–3.5)
GLUCOSE SERPL-MCNC: 102 MG/DL (ref 65–99)
HCT VFR BLD AUTO: 37.1 % (ref 37–47)
HGB BLD-MCNC: 12.3 G/DL (ref 12–16)
IMM GRANULOCYTES # BLD AUTO: 0.5 K/UL (ref 0–0.11)
IMM GRANULOCYTES NFR BLD AUTO: 4.2 % (ref 0–0.9)
LACTATE SERPL-SCNC: 1.3 MMOL/L (ref 0.5–2)
LYMPHOCYTES # BLD AUTO: 2.81 K/UL (ref 1–4.8)
LYMPHOCYTES NFR BLD: 23.6 % (ref 22–41)
MCH RBC QN AUTO: 27.5 PG (ref 27–33)
MCHC RBC AUTO-ENTMCNC: 33.2 G/DL (ref 32.2–35.5)
MCV RBC AUTO: 83 FL (ref 81.4–97.8)
MONOCYTES # BLD AUTO: 0.77 K/UL (ref 0–0.85)
MONOCYTES NFR BLD AUTO: 6.5 % (ref 0–13.4)
NEUTROPHILS # BLD AUTO: 7.7 K/UL (ref 1.82–7.42)
NEUTROPHILS NFR BLD: 64.6 % (ref 44–72)
NRBC # BLD AUTO: 0.02 K/UL
NRBC BLD-RTO: 0.2 /100 WBC (ref 0–0.2)
PLATELET # BLD AUTO: 582 K/UL (ref 164–446)
PLATELET BLD QL SMEAR: NORMAL
PMV BLD AUTO: 8.5 FL (ref 9–12.9)
POTASSIUM SERPL-SCNC: 4 MMOL/L (ref 3.6–5.5)
PROCALCITONIN SERPL-MCNC: 0.18 NG/ML
PROT SERPL-MCNC: 8.1 G/DL (ref 6–8.2)
RBC # BLD AUTO: 4.47 M/UL (ref 4.2–5.4)
RBC BLD AUTO: NORMAL
RSV RNA SPEC QL NAA+PROBE: NEGATIVE
SARS-COV-2 RNA RESP QL NAA+PROBE: NOTDETECTED
SODIUM SERPL-SCNC: 135 MMOL/L (ref 135–145)
SPECIMEN SOURCE: NORMAL
WBC # BLD AUTO: 11.9 K/UL (ref 4.8–10.8)

## 2024-09-15 PROCEDURE — 85025 COMPLETE CBC W/AUTO DIFF WBC: CPT

## 2024-09-15 PROCEDURE — 80053 COMPREHEN METABOLIC PANEL: CPT

## 2024-09-15 PROCEDURE — 99283 EMERGENCY DEPT VISIT LOW MDM: CPT

## 2024-09-15 PROCEDURE — 36415 COLL VENOUS BLD VENIPUNCTURE: CPT

## 2024-09-15 PROCEDURE — 71045 X-RAY EXAM CHEST 1 VIEW: CPT

## 2024-09-15 PROCEDURE — 0241U HCHG SARS-COV-2 COVID-19 NFCT DS RESP RNA 4 TRGT MIC: CPT

## 2024-09-15 PROCEDURE — 83605 ASSAY OF LACTIC ACID: CPT

## 2024-09-15 PROCEDURE — 84145 PROCALCITONIN (PCT): CPT

## 2024-09-15 RX ORDER — MELOXICAM 7.5 MG/1
7.5 TABLET ORAL EVERY EVENING
COMMUNITY

## 2024-09-15 RX ORDER — CYCLOBENZAPRINE HCL 5 MG
5 TABLET ORAL 3 TIMES DAILY
COMMUNITY

## 2024-09-15 RX ORDER — CEFDINIR 300 MG/1
300 CAPSULE ORAL 2 TIMES DAILY
COMMUNITY

## 2024-09-15 RX ORDER — ACETAMINOPHEN 500 MG
1000 TABLET ORAL EVERY 6 HOURS PRN
COMMUNITY

## 2024-09-15 RX ORDER — AZITHROMYCIN 500 MG/1
500 TABLET, FILM COATED ORAL DAILY
Qty: 3 TABLET | Refills: 0 | Status: ACTIVE | OUTPATIENT
Start: 2024-09-15 | End: 2024-09-18

## 2024-09-15 ASSESSMENT — FIBROSIS 4 INDEX: FIB4 SCORE: 0.43

## 2024-09-15 NOTE — ED NOTES
Patient verbalized understanding to plan of care and discharge information. Patient in stable condition. Patient ambulated out of ED to person vehicle with stable gait with .

## 2024-09-15 NOTE — DISCHARGE INSTRUCTIONS
Stop Cefdinir.  Start the 2 new antibiotics.  Return if your symptoms worsen or change in any way.

## 2024-09-15 NOTE — ED PROVIDER NOTES
"ED Provider Note    CHIEF COMPLAINT  Chief Complaint   Patient presents with    Shortness of Breath    Cough     Reports recent dx of PNA and started on antibx. States she is feeling worse. Reports cough, fever, SOB.        HPI  Therese Stephens is a 44 y.o. female who presents for evaluation of shortness of breath, cough, and fevers which have been persisting for almost 2 weeks.  Patient notes that she has been on antibiotics for both kidney infection and \"pneumonia\" which was diagnosed most recently.  She notes no nausea or vomiting and has no abdominal pain.  She does note her daughter has been sick with a cough for 2 weeks as well.  EXTERNAL RECORDS REVIEWED  Reviewed office visit on the seventh for painful urination.  ROS  Constitutional: Fevers and chills noted.  Skin: No rashes  HEENT: No sore throat, or runny nose  Neck: No neck pain  Chest: No pain or rashes  Pulm: Shortness of breath and cough noted.  Gastrointestinal: No nausea, vomiting, diarrhea, constipation, bloating, melena, hematochezia or abdominal pain.  Genitourinary: No dysuria or hematuria  Musculoskeletal: No pain, swelling, or focal weakness  Heme: No bleeding or bruising problems.   Immuno: No hx of recurrent infections        LIMITATION TO HISTORY   None  OUTSIDE HISTORIAN(S):  None        PAST FAM HISTORY  Family History   Problem Relation Age of Onset    No Known Problems Mother     No Known Problems Father     Hypertension Neg Hx     Heart Disease Neg Hx     Stroke Neg Hx     Cancer Neg Hx        PAST MEDICAL HISTORY   has a past medical history of  (normal spontaneous vaginal delivery) (2018).    SOCIAL HISTORY  Social History     Tobacco Use    Smoking status: Never    Smokeless tobacco: Never   Vaping Use    Vaping status: Never Used   Substance and Sexual Activity    Alcohol use: No    Drug use: No    Sexual activity: Not on file       SURGICAL HISTORY  patient denies any surgical history    CURRENT " "MEDICATIONS  Home Medications       Reviewed by Ana M Rizo (Pharmacy Tech) on 09/15/24 at 1446  Med List Status: Complete     Medication Last Dose Status   acetaminophen (TYLENOL) 500 MG Tab 9/15/2024 Active   cefdinir (OMNICEF) 300 MG Cap 9/15/2024 Active   cyclobenzaprine (FLEXERIL) 5 mg tablet 9/15/2024 Active   meloxicam (MOBIC) 7.5 MG Tab 9/14/2024 Active                     ALLERGIES  No Known Allergies    PHYSICAL EXAM  VITAL SIGNS: /77   Pulse 74   Temp (!) 35.7 °C (96.2 °F) (Temporal)   Resp (!) 24   Ht 1.651 m (5' 5\")   Wt 94.3 kg (207 lb 14.3 oz)   LMP  (LMP Unknown)   SpO2 94%   Breastfeeding No Comment: IUD  BMI 34.60 kg/m²    Gen: Alert in no apparent distress.  HEENT: No signs of trauma, Bilateral external ears normal, Nose normal. Conjunctiva normal, Non-icteric.   Neck:  No tenderness, Supple, No masses  Lymphatic: No cervical lymphadenopathy noted.   Cardiovascular: Regular rate and rhythm, no murmurs.  Capillary refill less than 3 seconds to all extremities, 2+ distal pulses.  Thorax & Lungs: Mild tachypnea noted, no retractions noted.  No prolonged expiratory phase.  Faint crackles heard in the left lung diffusely.  Abdomen: Bowel sounds normal, Soft, No tenderness, No masses, No pulsatile masses. No Guarding or rebound  Skin: Warm, Dry, No erythema, No rash noted to exposed areas.   Extremities: Intact distal pulses, No edema  Neurologic: Alert , no facial droop, grossly normal coordination and strength  Psychiatric: Affect pleasant    INITIAL IMPRESSION  Patient arrives for what appears to be continuing symptoms of upper respiratory infection, likely the pneumonia will she was noticed with recently.  She is on day 5 of cefdinir at this point.  She states understanding that we will perform screening labs and a chest x-ray and consider CT imaging if she experiences deterioration or develops hypoxia.     ED observation? No    LABS  Results for orders placed or performed " during the hospital encounter of 09/15/24   CBC WITH DIFFERENTIAL   Result Value Ref Range    WBC 11.9 (H) 4.8 - 10.8 K/uL    RBC 4.47 4.20 - 5.40 M/uL    Hemoglobin 12.3 12.0 - 16.0 g/dL    Hematocrit 37.1 37.0 - 47.0 %    MCV 83.0 81.4 - 97.8 fL    MCH 27.5 27.0 - 33.0 pg    MCHC 33.2 32.2 - 35.5 g/dL    RDW 43.8 35.9 - 50.0 fL    Platelet Count 582 (H) 164 - 446 K/uL    MPV 8.5 (L) 9.0 - 12.9 fL    Neutrophils-Polys 64.60 44.00 - 72.00 %    Lymphocytes 23.60 22.00 - 41.00 %    Monocytes 6.50 0.00 - 13.40 %    Eosinophils 0.70 0.00 - 6.90 %    Basophils 0.40 0.00 - 1.80 %    Immature Granulocytes 4.20 (H) 0.00 - 0.90 %    Nucleated RBC 0.20 0.00 - 0.20 /100 WBC    Neutrophils (Absolute) 7.70 (H) 1.82 - 7.42 K/uL    Lymphs (Absolute) 2.81 1.00 - 4.80 K/uL    Monos (Absolute) 0.77 0.00 - 0.85 K/uL    Eos (Absolute) 0.08 0.00 - 0.51 K/uL    Baso (Absolute) 0.05 0.00 - 0.12 K/uL    Immature Granulocytes (abs) 0.50 (H) 0.00 - 0.11 K/uL    NRBC (Absolute) 0.02 K/uL   COMP METABOLIC PANEL   Result Value Ref Range    Sodium 135 135 - 145 mmol/L    Potassium 4.0 3.6 - 5.5 mmol/L    Chloride 95 (L) 96 - 112 mmol/L    Co2 23 20 - 33 mmol/L    Anion Gap 17.0 (H) 7.0 - 16.0    Glucose 102 (H) 65 - 99 mg/dL    Bun 12 8 - 22 mg/dL    Creatinine 0.52 0.50 - 1.40 mg/dL    Calcium 9.3 8.4 - 10.2 mg/dL    Correct Calcium 9.8 8.5 - 10.5 mg/dL    AST(SGOT) 39 12 - 45 U/L    ALT(SGPT) 22 2 - 50 U/L    Alkaline Phosphatase 178 (H) 30 - 99 U/L    Total Bilirubin 0.3 0.1 - 1.5 mg/dL    Albumin 3.4 3.2 - 4.9 g/dL    Total Protein 8.1 6.0 - 8.2 g/dL    Globulin 4.7 (H) 1.9 - 3.5 g/dL    A-G Ratio 0.7 g/dL   LACTIC ACID   Result Value Ref Range    Lactic Acid 1.3 0.5 - 2.0 mmol/L   CoV-2, Flu A/B, And RSV by PCR ([employer]    Specimen: Mid-Turbinate; Respirate   Result Value Ref Range    Influenza virus A RNA Negative Negative    Influenza virus B, PCR Negative Negative    RSV, PCR Negative Negative    SARS-CoV-2 by PCR NotDetected      SARS-CoV-2 Source NP Swab    PROCALCITONIN   Result Value Ref Range    Procalcitonin 0.18 <0.25 ng/mL   PLATELET ESTIMATE   Result Value Ref Range    Plt Estimation Increased    MORPHOLOGY   Result Value Ref Range    RBC Morphology Normal    ESTIMATED GFR   Result Value Ref Range    GFR (CKD-EPI) 117 >60 mL/min/1.73 m 2       I have independently interpreted the diagnostic imaging associated with this visit and am waiting the final reading from the radiologist.   My preliminary interpretation is a follows: Chest x-ray: There are opacities in the right midlung region that would suggest a lobar pneumonia.  There are no effusions.  Cardiomediastinal silhouette appears to be within normal limits.  RADIOLOGY  DX-CHEST-PORTABLE (1 VIEW)   Final Result      Right peripheral midlung and left lower lung opacities. These are concerning for pneumonia.              ASSESSMENT, COURSE AND PLAN  Care Narrative: Patient has findings and symptoms highly suggestive of a partially treated community-acquired pneumonia.  This appears to be lobar by x-ray although her lactate is normal as is her procalcitonin.  While this is reassuring, I am not convinced that cefdinir is treating the problem entirely.  After discussion with the pharmacist, I will switch the patient to Augmentin for the next 5 days and give her a 3-day course of azithromycin as well.  This should cover the vast majority of community-acquired pneumonia issues.  She states understanding that if her symptoms worsen or change, she would need to return for reevaluation once again.  At this point I do not feel she requires inpatient evaluation, although it is notable that her white blood cell count was 11.9 and she did have a bandemia with little over 4%.  These did not meet sepsis criteria and I felt the patient was safe for attempted treatment with the new antibiotics.            ADDITIONAL PROBLEMS MANAGED    Pertinent Labs & Imaging studies reviewed. (See chart for  details)      I have discussed management of the patient with the following physicians and [facility]'s: None    Escalation of care considered, and ultimately not performed: Inpatient treatment    Barriers to care at this time, including but not limited to: .  None    Decision tools and Rx drugs considered including, but not limited to : Antibiotics    Discussion of management with other QHP or appropriate source(s): None    The patient will not drink alcohol nor drive with prescribed medications. The patient will return for worsening symptoms and is stable at the time of discharge. The patient verbalizes understanding and will comply.    FINAL IMPRESSION  1. Pneumonia of left lower lobe due to infectious organism        Electronically signed by: Mitchell Mckeon M.D., 9/15/2024 2:00 PM

## 2024-09-15 NOTE — ED NOTES
Medication history reviewed with pt and pts RX bottles. Med rec is complete.  Allergies reviewed, per pt.   Interviewed pt with  at bedside with permission from pt.    Pt had RX bottles at bedside, went over RX bottles and returned RX bottles back to pts      Patient has had outpatient antibiotics in the last 30 days, pt started CEFDINIR 300MG on 9/10/2024 for 7 day course.  Last dose was taken on 9/15/2024 at 1000    Pt is not on any anticoagulants

## 2024-09-15 NOTE — ED TRIAGE NOTES
Chief Complaint   Patient presents with    Shortness of Breath    Cough     Reports recent dx of PNA and started on antibx. States she is feeling worse. Reports cough, fever, SOB.      Physical Exam  Pulmonary:      Effort: Pulmonary effort is normal. Tachypnea present.   Skin:     General: Skin is warm and dry.   Neurological:      Mental Status: She is alert.